# Patient Record
Sex: FEMALE | Race: WHITE
[De-identification: names, ages, dates, MRNs, and addresses within clinical notes are randomized per-mention and may not be internally consistent; named-entity substitution may affect disease eponyms.]

---

## 2018-03-20 ENCOUNTER — HOSPITAL ENCOUNTER (INPATIENT)
Dept: HOSPITAL 88 - ER | Age: 73
LOS: 7 days | Discharge: HOME | DRG: 871 | End: 2018-03-27
Attending: INTERNAL MEDICINE | Admitting: INTERNAL MEDICINE
Payer: MEDICARE

## 2018-03-20 VITALS — DIASTOLIC BLOOD PRESSURE: 59 MMHG | SYSTOLIC BLOOD PRESSURE: 105 MMHG

## 2018-03-20 VITALS — DIASTOLIC BLOOD PRESSURE: 58 MMHG | SYSTOLIC BLOOD PRESSURE: 115 MMHG

## 2018-03-20 VITALS — HEIGHT: 61 IN | WEIGHT: 100.03 LBS | BODY MASS INDEX: 18.88 KG/M2

## 2018-03-20 DIAGNOSIS — J44.1: ICD-10-CM

## 2018-03-20 DIAGNOSIS — J98.11: ICD-10-CM

## 2018-03-20 DIAGNOSIS — A41.9: Primary | ICD-10-CM

## 2018-03-20 DIAGNOSIS — Z72.0: ICD-10-CM

## 2018-03-20 DIAGNOSIS — D64.9: ICD-10-CM

## 2018-03-20 DIAGNOSIS — G40.909: ICD-10-CM

## 2018-03-20 DIAGNOSIS — Z91.81: ICD-10-CM

## 2018-03-20 DIAGNOSIS — E87.1: ICD-10-CM

## 2018-03-20 DIAGNOSIS — J18.9: ICD-10-CM

## 2018-03-20 DIAGNOSIS — F03.90: ICD-10-CM

## 2018-03-20 DIAGNOSIS — E86.0: ICD-10-CM

## 2018-03-20 DIAGNOSIS — J45.901: ICD-10-CM

## 2018-03-20 DIAGNOSIS — F05: ICD-10-CM

## 2018-03-20 LAB
ALBUMIN SERPL-MCNC: 2.7 G/DL (ref 3.5–5)
ALBUMIN/GLOB SERPL: 0.8 {RATIO} (ref 0.8–2)
ALP SERPL-CCNC: 72 IU/L (ref 40–150)
ALT SERPL-CCNC: 8 IU/L (ref 0–55)
ANION GAP SERPL CALC-SCNC: 13.7 MMOL/L (ref 8–16)
BACTERIA URNS QL MICRO: (no result) /HPF
BASE EXCESS BLDA CALC-SCNC: 10 MMOL/L (ref -2–3)
BASOPHILS # BLD AUTO: 0 10*3/UL (ref 0–0.1)
BASOPHILS NFR BLD AUTO: 0.2 % (ref 0–1)
BILIRUB UR QL: NEGATIVE
BUN SERPL-MCNC: 20 MG/DL (ref 7–26)
BUN/CREAT SERPL: 17 (ref 6–25)
CALCIUM SERPL-MCNC: 9.1 MG/DL (ref 8.4–10.2)
CHLORIDE SERPL-SCNC: 87 MMOL/L (ref 98–107)
CK MB SERPL-MCNC: 1.3 NG/ML (ref 0–5)
CK SERPL-CCNC: 290 IU/L (ref 29–168)
CLARITY UR: CLEAR
CO2 SERPL-SCNC: 33 MMOL/L (ref 22–29)
COLOR UR: YELLOW
DEPRECATED APTT PLAS QN: 34.3 SECONDS (ref 23.8–35.5)
DEPRECATED INR PLAS: 1.2
DEPRECATED NEUTROPHILS # BLD AUTO: 10.1 10*3/UL (ref 2.1–6.9)
DEPRECATED RBC URNS MANUAL-ACNC: (no result) /HPF (ref 0–5)
EGFRCR SERPLBLD CKD-EPI 2021: 45 ML/MIN (ref 60–?)
EOSINOPHIL # BLD AUTO: 0 10*3/UL (ref 0–0.4)
EOSINOPHIL NFR BLD AUTO: 0 % (ref 0–6)
EPI CELLS URNS QL MICRO: (no result) /LPF
ERYTHROCYTE [DISTWIDTH] IN CORD BLOOD: 11.7 % (ref 11.7–14.4)
GLOBULIN PLAS-MCNC: 3.5 G/DL (ref 2.3–3.5)
GLUCOSE SERPLBLD-MCNC: 101 MG/DL (ref 74–118)
HCO3 BLDA-SCNC: 35 MMOL/L (ref 23–28)
HCT VFR BLD AUTO: 32.8 % (ref 34.2–44.1)
HGB BLD-MCNC: 10.9 G/DL (ref 12–16)
KETONES UR QL STRIP.AUTO: NEGATIVE
LEUKOCYTE ESTERASE UR QL STRIP.AUTO: NEGATIVE
LYMPHOCYTES # BLD: 1.5 10*3/UL (ref 1–3.2)
LYMPHOCYTES NFR BLD AUTO: 11.6 % (ref 18–39.1)
MAGNESIUM SERPL-MCNC: 1.3 MG/DL (ref 1.3–2.1)
MCH RBC QN AUTO: 33.2 PG (ref 28–32)
MCHC RBC AUTO-ENTMCNC: 33.2 G/DL (ref 31–35)
MCV RBC AUTO: 100 FL (ref 81–99)
MONOCYTES # BLD AUTO: 1 10*3/UL (ref 0.2–0.8)
MONOCYTES NFR BLD AUTO: 7.9 % (ref 4.4–11.3)
NEUTS SEG NFR BLD AUTO: 79.7 % (ref 38.7–80)
NITRITE UR QL STRIP.AUTO: NEGATIVE
PCO2 BLDA: 59 MMHG (ref 41–51)
PCO2 BLDA: 60 MMHG (ref 80–105)
PH BLDA: 7.38 [PH] (ref 7.31–7.41)
PLATELET # BLD AUTO: 170 X10E3/UL (ref 140–360)
POTASSIUM SERPL-SCNC: 3.7 MMOL/L (ref 3.5–5.1)
PROT UR QL STRIP.AUTO: (no result)
PROTHROMBIN TIME: 14.3 SECONDS (ref 11.9–14.5)
RBC # BLD AUTO: 3.28 X10E6/UL (ref 3.6–5.1)
SAO2 % BLDA: 89 % (ref 95–98)
SODIUM SERPL-SCNC: 130 MMOL/L (ref 136–145)
SP GR UR STRIP: 1.01 (ref 1.01–1.02)
UROBILINOGEN UR STRIP-MCNC: 0.2 MG/DL (ref 0.2–1)
WBC #/AREA URNS HPF: (no result) /HPF (ref 0–5)

## 2018-03-20 PROCEDURE — 36415 COLL VENOUS BLD VENIPUNCTURE: CPT

## 2018-03-20 PROCEDURE — 96365 THER/PROPH/DIAG IV INF INIT: CPT

## 2018-03-20 PROCEDURE — 94640 AIRWAY INHALATION TREATMENT: CPT

## 2018-03-20 PROCEDURE — 36600 WITHDRAWAL OF ARTERIAL BLOOD: CPT

## 2018-03-20 PROCEDURE — 80048 BASIC METABOLIC PNL TOTAL CA: CPT

## 2018-03-20 PROCEDURE — 99285 EMERGENCY DEPT VISIT HI MDM: CPT

## 2018-03-20 PROCEDURE — 80053 COMPREHEN METABOLIC PANEL: CPT

## 2018-03-20 PROCEDURE — 87116 MYCOBACTERIA CULTURE: CPT

## 2018-03-20 PROCEDURE — 85730 THROMBOPLASTIN TIME PARTIAL: CPT

## 2018-03-20 PROCEDURE — 87070 CULTURE OTHR SPECIMN AEROBIC: CPT

## 2018-03-20 PROCEDURE — 97139 UNLISTED THERAPEUTIC PX: CPT

## 2018-03-20 PROCEDURE — 85610 PROTHROMBIN TIME: CPT

## 2018-03-20 PROCEDURE — 84484 ASSAY OF TROPONIN QUANT: CPT

## 2018-03-20 PROCEDURE — 87205 SMEAR GRAM STAIN: CPT

## 2018-03-20 PROCEDURE — 87206 SMEAR FLUORESCENT/ACID STAI: CPT

## 2018-03-20 PROCEDURE — 82805 BLOOD GASES W/O2 SATURATION: CPT

## 2018-03-20 PROCEDURE — 81001 URINALYSIS AUTO W/SCOPE: CPT

## 2018-03-20 PROCEDURE — 71260 CT THORAX DX C+: CPT

## 2018-03-20 PROCEDURE — 93005 ELECTROCARDIOGRAM TRACING: CPT

## 2018-03-20 PROCEDURE — 83735 ASSAY OF MAGNESIUM: CPT

## 2018-03-20 PROCEDURE — 82550 ASSAY OF CK (CPK): CPT

## 2018-03-20 PROCEDURE — 85025 COMPLETE CBC W/AUTO DIFF WBC: CPT

## 2018-03-20 PROCEDURE — 71045 X-RAY EXAM CHEST 1 VIEW: CPT

## 2018-03-20 PROCEDURE — 70450 CT HEAD/BRAIN W/O DYE: CPT

## 2018-03-20 PROCEDURE — 82553 CREATINE MB FRACTION: CPT

## 2018-03-20 PROCEDURE — 94660 CPAP INITIATION&MGMT: CPT

## 2018-03-20 PROCEDURE — 36569 INSJ PICC 5 YR+ W/O IMAGING: CPT

## 2018-03-20 PROCEDURE — 83880 ASSAY OF NATRIURETIC PEPTIDE: CPT

## 2018-03-20 RX ADMIN — SODIUM CHLORIDE SCH MLS/HR: 9 INJECTION, SOLUTION INTRAVENOUS at 16:49

## 2018-03-20 NOTE — DIAGNOSTIC IMAGING REPORT
Exam: Head CT without contrast

History:  Dizziness, multiple falls

Comparison studies:  None



Technique:

Axial images were obtained from the skull base to the vertex.

Coronal and sagittal images reconstructed from the axial data.

Intravenous contrast: None



Findings:



Scalp: No abnormalities.

Bones: No fractures, blastic or lytic lesions.



Brain sulci: Mildly prominent.

Ventricles: Within normal limits for size. No hydrocephalus.

Extra-axial spaces: No masses, no fluid collection. 



Parenchyma: 

No mass, acute hemorrhage or acute cortical vascular insults. A few subtle

hypodensities in the supratentorial white matter are nonspecific but most

compatible with chronic small vessel ischemic changes. There is a chronic

cortical based insult with encephalomalacia in the left precentral gyrus just

medial to the hand knob.



Sellar/suprasellar region: No abnormalities.

Craniocervical junction: Patent foramen magnum. No Chiari one malformation.



Included paranasal sinuses: Mild nonspecific inflammatory mucosal thickening

with frothy secretions in the bilateral maxillary sinuses. The left inferior

frontal sinus is partially opacified. 



Incidental findings: 

Atherosclerotic calcifications in the carotid siphons. Bilateral lens

replacements related to previous cataract surgery. 



IMPRESSION:

 

No acute intracranial abnormalities.



Chronic findings:

1.  Mild generalized volume loss.

2.  Small chronic left precentral cortical insult.

3.  Mild supratentorial chronic microvascular ischemic changes.



Signed by: Dr. Jordon Hawthorne M.D. on 3/20/2018 12:00 PM

## 2018-03-20 NOTE — XMS REPORT
Patient Summary Document

 Created on: 2018



MARY LOCO

External Reference #: [id number]

: 1945

Sex: Female



Demographics







 Address  [address] [place]  [address]

 

 Home Phone  [phone]

 

 Preferred Language  Unknown

 

 Marital Status  Unknown

 

 Restorationist Affiliation  Unknown

 

 Race  Unknown

 

 Additional Race(s)  

 

 Ethnic Group  Unknown





Author







 Author  [facility]

 

 [facility]

 

 Address  Unknown

 

 Phone  Unavailable







Care Team Providers







 Care Team Member Name  Role  Phone

 

 MICHAEL BECK  Unavailable  Unavailable







Problems

This patient has no known problems.



Allergies, Adverse Reactions, Alerts

This patient has no known allergies or adverse reactions.



Medications

This patient has no known medications.



Results







 Test Description  Test Time  Test Comments  Text Results  Atomic Results  
Result Comments









 CHEST SINGLE (PORTABLE)            [place] [address]      Patient Name: MARY LOCO   MR #: [id number]    : 1945 Age/Sex: 72/F  Acct #: 
[id number] Req #: [id number]  Adm Physician:     Ordered by: KAVYA JENNINGS NP  Report #: [id number]   Location: ER  Room/Bed:     ________________________
___________________________________________________________________________    
Procedure: [id number] DX/CHEST SINGLE (PORTABLE)  Exam Date: 18           
                 Exam Time: 1130       REPORT STATUS: Signed    PROCEDURE:   A 
single AP view of the chest.       COMPARISON: [facility], DX, 
CHEST SINGLE (PORTABLE),    6/15/2017, 8:49.  Patients [facility], CT, CT 
ABDOMEN/PELVIS WO,    6/15/2017, 9:56.       INDICATIONS:   DIZZY, WEAKNESS    
       FINDINGS:   Lines/tubes:  None.       Lungs: Lungs are well inflated. 
Interval increase in size of right    infrahilar 5.3 x 5.0 cm mass (previously 
measured 3.7 x 4.1 cm). There    is increased prominence of the interstitial 
markings extending from    bilateral danay. No consolidation.       Pleura:  
There is no pleural effusion or pneumothorax.       Heart and mediastinum: 
Cardiac silhouette is unremarkable. Prominence    of bilateral pulmonary 
arteries.   Bones:  No acute bony abnormality. Stable deformity of the left 
ninth    rib, likely reflecting old healed fracture       IMPRESSION:        1. 
interval increase in size in previously visualized right infrahilar    mass, 
suspicious for bronchogenic neoplasm and/or adenopathy. Contrast    enhanced 
chest CT is recommended for further evaluation.   2. Increased prominence of 
interstitial markings extending from    bilateral danay may reflect mild 
interstitial edema, however,    lymphangitic spread of neoplasm is also a 
consideration.               Irene Baumann M.D.     Dictated by:  Irene Baumann M.D. on 3/20/2018 at 12:02        Electronically approved by:  
Irene Baumann M.D. on 3/20/2018 at    12:02                Dictated By: 
IRENE BAUMANN MD  Electronically Signed By: IRENE BAUMANN MD on 18 1202
  Transcribed By: RICK on 18 1202       COPY TO:   KAVYA JENNINGS NP  
         

 

 CT BRAIN WO            [place] [address]      Patient Name: MARY LOCO
   MR #: [id number]    : 1945 Age/Sex: 72/F  Acct #: [id number] Req #
: [id number]  Adm Physician:     Ordered by: KAVYA JENNINGS NP  Report #: 
[id number]   Location: ER  Room/Bed:     ________________________________________
___________________________________________________________    Procedure: [id number]
[id number] CT/CT BRAIN WO  Exam Date: 18                            Exam Time: 
1125       REPORT STATUS: Signed    Exam: Head CT without contrast   History:  
Dizziness, multiple falls   Comparison studies:  None      Technique:   Axial 
images were obtained from the skull base to the vertex.   Coronal and sagittal 
images reconstructed from the axial data.   Intravenous contrast: None      
Findings:      Scalp: No abnormalities.   Bones: No fractures, blastic or lytic 
lesions.      Brain sulci: Mildly prominent.   Ventricles: Within normal limits 
for size. No hydrocephalus.   Extra-axial spaces: No masses, no fluid 
collection.       Parenchyma:    No mass, acute hemorrhage or acute cortical 
vascular insults. A few subtle   hypodensities in the supratentorial white 
matter are nonspecific but most   compatible with chronic small vessel ischemic 
changes. There is a chronic   cortical based insult with encephalomalacia in 
the left precentral gyrus just   medial to the hand knob.      Sellar/
suprasellar region: No abnormalities.   Craniocervical junction: Patent foramen 
magnum. No Chiari one malformation.      Included paranasal sinuses: Mild 
nonspecific inflammatory mucosal thickening   with frothy secretions in the 
bilateral maxillary sinuses. The left inferior   frontal sinus is partially 
opacified.       Incidental findings:    Atherosclerotic calcifications in the 
carotid siphons. Bilateral lens   replacements related to previous cataract 
surgery.       IMPRESSION:       No acute intracranial abnormalities.      
Chronic findings:   1.  Mild generalized volume loss.   2.  Small chronic left 
precentral cortical insult.   3.  Mild supratentorial chronic microvascular 
ischemic changes.      Signed by: Dr. Maddi Hawthorne M.D. on 3/20/2018 12:00 PM
        Dictated By: MADDI HAWTHORNE MD  Electronically Signed By: MADDI HAWTHORNE MD on 18 1200  Transcribed By: MAURI on 18 1200       
COPY TO:   KAVYA JENNINGS NP

## 2018-03-20 NOTE — DIAGNOSTIC IMAGING REPORT
PROCEDURE:

A single AP view of the chest.

 

COMPARISON: Patients [facility], DX, CHEST SINGLE (PORTABLE), 

6/15/2017, 8:49.  Patients [facility], CT, CT ABDOMEN/PELVIS WO, 

6/15/2017, 9:56.

 

INDICATIONS:   DIZZY, WEAKNESS

     

FINDINGS:

Lines/tubes:  None.

 

Lungs: Lungs are well inflated. Interval increase in size of right 

infrahilar 5.3 x 5.0 cm mass (previously measured 3.7 x 4.1 cm). There 

is increased prominence of the interstitial markings extending from 

bilateral daany. No consolidation.

 

Pleura:  There is no pleural effusion or pneumothorax.

 

Heart and mediastinum: Cardiac silhouette is unremarkable. Prominence 

of bilateral pulmonary arteries.

Bones:  No acute bony abnormality. Stable deformity of the left ninth 

rib, likely reflecting old healed fracture

 

IMPRESSION: 

 

1. interval increase in size in previously visualized right infrahilar 

mass, suspicious for bronchogenic neoplasm and/or adenopathy. Contrast 

enhanced chest CT is recommended for further evaluation.

2. Increased prominence of interstitial markings extending from 

bilateral danay may reflect mild interstitial edema, however, 

lymphangitic spread of neoplasm is also a consideration.

 

 

 

Solomon Baumann M.D.  

Dictated by:  Solomon Baumann M.D. on 3/20/2018 at 12:02     

Electronically approved by:  Solomon Baumann M.D. on 3/20/2018 at 

12:02

## 2018-03-21 VITALS — SYSTOLIC BLOOD PRESSURE: 144 MMHG | DIASTOLIC BLOOD PRESSURE: 134 MMHG

## 2018-03-21 VITALS — SYSTOLIC BLOOD PRESSURE: 129 MMHG | DIASTOLIC BLOOD PRESSURE: 118 MMHG

## 2018-03-21 VITALS — SYSTOLIC BLOOD PRESSURE: 126 MMHG | DIASTOLIC BLOOD PRESSURE: 63 MMHG

## 2018-03-21 VITALS — SYSTOLIC BLOOD PRESSURE: 144 MMHG | DIASTOLIC BLOOD PRESSURE: 63 MMHG

## 2018-03-21 VITALS — DIASTOLIC BLOOD PRESSURE: 68 MMHG | SYSTOLIC BLOOD PRESSURE: 138 MMHG

## 2018-03-21 VITALS — DIASTOLIC BLOOD PRESSURE: 76 MMHG | SYSTOLIC BLOOD PRESSURE: 138 MMHG

## 2018-03-21 VITALS — SYSTOLIC BLOOD PRESSURE: 129 MMHG | DIASTOLIC BLOOD PRESSURE: 104 MMHG

## 2018-03-21 VITALS — SYSTOLIC BLOOD PRESSURE: 139 MMHG | DIASTOLIC BLOOD PRESSURE: 63 MMHG

## 2018-03-21 VITALS — DIASTOLIC BLOOD PRESSURE: 60 MMHG | SYSTOLIC BLOOD PRESSURE: 122 MMHG

## 2018-03-21 VITALS — SYSTOLIC BLOOD PRESSURE: 138 MMHG | DIASTOLIC BLOOD PRESSURE: 98 MMHG

## 2018-03-21 VITALS — DIASTOLIC BLOOD PRESSURE: 59 MMHG | SYSTOLIC BLOOD PRESSURE: 122 MMHG

## 2018-03-21 VITALS — DIASTOLIC BLOOD PRESSURE: 60 MMHG | SYSTOLIC BLOOD PRESSURE: 131 MMHG

## 2018-03-21 VITALS — SYSTOLIC BLOOD PRESSURE: 144 MMHG | DIASTOLIC BLOOD PRESSURE: 64 MMHG

## 2018-03-21 VITALS — SYSTOLIC BLOOD PRESSURE: 112 MMHG | DIASTOLIC BLOOD PRESSURE: 69 MMHG

## 2018-03-21 VITALS — DIASTOLIC BLOOD PRESSURE: 63 MMHG | SYSTOLIC BLOOD PRESSURE: 144 MMHG

## 2018-03-21 VITALS — DIASTOLIC BLOOD PRESSURE: 85 MMHG | SYSTOLIC BLOOD PRESSURE: 129 MMHG

## 2018-03-21 VITALS — SYSTOLIC BLOOD PRESSURE: 123 MMHG | DIASTOLIC BLOOD PRESSURE: 105 MMHG

## 2018-03-21 VITALS — SYSTOLIC BLOOD PRESSURE: 126 MMHG | DIASTOLIC BLOOD PRESSURE: 67 MMHG

## 2018-03-21 VITALS — SYSTOLIC BLOOD PRESSURE: 117 MMHG | DIASTOLIC BLOOD PRESSURE: 72 MMHG

## 2018-03-21 VITALS — DIASTOLIC BLOOD PRESSURE: 77 MMHG | SYSTOLIC BLOOD PRESSURE: 141 MMHG

## 2018-03-21 VITALS — DIASTOLIC BLOOD PRESSURE: 58 MMHG | SYSTOLIC BLOOD PRESSURE: 127 MMHG

## 2018-03-21 LAB
ANION GAP SERPL CALC-SCNC: 13.3 MMOL/L (ref 8–16)
BASE EXCESS BLDA CALC-SCNC: 11 MMOL/L (ref -2–3)
BASOPHILS # BLD AUTO: 0 10*3/UL (ref 0–0.1)
BASOPHILS NFR BLD AUTO: 0.2 % (ref 0–1)
BUN SERPL-MCNC: 12 MG/DL (ref 7–26)
BUN/CREAT SERPL: 16 (ref 6–25)
CALCIUM SERPL-MCNC: 9.1 MG/DL (ref 8.4–10.2)
CHLORIDE SERPL-SCNC: 94 MMOL/L (ref 98–107)
CO2 SERPL-SCNC: 33 MMOL/L (ref 22–29)
DEPRECATED NEUTROPHILS # BLD AUTO: 13.4 10*3/UL (ref 2.1–6.9)
EGFRCR SERPLBLD CKD-EPI 2021: > 60 ML/MIN (ref 60–?)
EOSINOPHIL # BLD AUTO: 0 10*3/UL (ref 0–0.4)
EOSINOPHIL NFR BLD AUTO: 0.1 % (ref 0–6)
ERYTHROCYTE [DISTWIDTH] IN CORD BLOOD: 11.8 % (ref 11.7–14.4)
GLUCOSE SERPLBLD-MCNC: 89 MG/DL (ref 74–118)
HCO3 BLDA-SCNC: 35 MMOL/L (ref 23–28)
HCT VFR BLD AUTO: 33.6 % (ref 34.2–44.1)
HGB BLD-MCNC: 10.5 G/DL (ref 12–16)
LYMPHOCYTES # BLD: 1.4 10*3/UL (ref 1–3.2)
LYMPHOCYTES NFR BLD AUTO: 8.5 % (ref 18–39.1)
MCH RBC QN AUTO: 32.3 PG (ref 28–32)
MCHC RBC AUTO-ENTMCNC: 31.3 G/DL (ref 31–35)
MCV RBC AUTO: 103.4 FL (ref 81–99)
MONOCYTES # BLD AUTO: 1.4 10*3/UL (ref 0.2–0.8)
MONOCYTES NFR BLD AUTO: 8.7 % (ref 4.4–11.3)
NEUTS SEG NFR BLD AUTO: 81.7 % (ref 38.7–80)
PCO2 BLDA: 52 MMHG (ref 41–51)
PCO2 BLDA: 65 MMHG (ref 80–105)
PH BLDA: 7.44 [PH] (ref 7.31–7.41)
PLATELET # BLD AUTO: 172 X10E3/UL (ref 140–360)
POTASSIUM SERPL-SCNC: 4.3 MMOL/L (ref 3.5–5.1)
RBC # BLD AUTO: 3.25 X10E6/UL (ref 3.6–5.1)
SAO2 % BLDA: 93 % (ref 95–98)
SODIUM SERPL-SCNC: 136 MMOL/L (ref 136–145)

## 2018-03-21 RX ADMIN — DOXEPIN HYDROCHLORIDE SCH MG: 25 CAPSULE ORAL at 22:30

## 2018-03-21 RX ADMIN — METHYLPREDNISOLONE SODIUM SUCCINATE SCH MG: 40 INJECTION, POWDER, LYOPHILIZED, FOR SOLUTION INTRAMUSCULAR; INTRAVENOUS at 17:58

## 2018-03-21 RX ADMIN — ASPIRIN 81 MG CHEWABLE TABLET SCH MG: 81 TABLET CHEWABLE at 08:30

## 2018-03-21 RX ADMIN — CLONAZEPAM SCH MG: 1 TABLET ORAL at 08:30

## 2018-03-21 RX ADMIN — LEVALBUTEROL HYDROCHLORIDE SCH MG: 1.25 SOLUTION RESPIRATORY (INHALATION) at 16:30

## 2018-03-21 RX ADMIN — TAZOBACTAM SODIUM AND PIPERACILLIN SODIUM SCH MLS/HR: 375; 3 INJECTION, SOLUTION INTRAVENOUS at 22:30

## 2018-03-21 RX ADMIN — SODIUM CHLORIDE SCH MLS/HR: 9 INJECTION, SOLUTION INTRAVENOUS at 19:25

## 2018-03-21 RX ADMIN — Medication SCH MG: at 08:31

## 2018-03-21 RX ADMIN — LEVETIRACETAM SCH MG: 500 TABLET, FILM COATED ORAL at 08:30

## 2018-03-21 RX ADMIN — METHYLPREDNISOLONE SODIUM SUCCINATE SCH MG: 40 INJECTION, POWDER, LYOPHILIZED, FOR SOLUTION INTRAMUSCULAR; INTRAVENOUS at 22:30

## 2018-03-21 RX ADMIN — BISOPROLOL FUMARATE SCH MG: 10 TABLET, COATED ORAL at 13:46

## 2018-03-21 RX ADMIN — BUDESONIDE SCH ML: 0.5 SUSPENSION RESPIRATORY (INHALATION) at 19:00

## 2018-03-21 RX ADMIN — NICOTINE SCH MG: 14 PATCH, EXTENDED RELEASE TRANSDERMAL at 01:02

## 2018-03-21 RX ADMIN — NICOTINE SCH MG: 14 PATCH, EXTENDED RELEASE TRANSDERMAL at 08:31

## 2018-03-21 RX ADMIN — SODIUM CHLORIDE SCH MLS/HR: 9 INJECTION, SOLUTION INTRAVENOUS at 06:05

## 2018-03-21 RX ADMIN — LEVALBUTEROL HYDROCHLORIDE SCH MG: 1.25 SOLUTION RESPIRATORY (INHALATION) at 19:00

## 2018-03-21 RX ADMIN — MONTELUKAST SODIUM SCH MG: 10 TABLET, FILM COATED ORAL at 22:30

## 2018-03-21 RX ADMIN — CLONAZEPAM SCH MG: 1 TABLET ORAL at 17:58

## 2018-03-21 RX ADMIN — LORAZEPAM PRN MG: 2 INJECTION INTRAMUSCULAR; INTRAVENOUS at 11:56

## 2018-03-21 RX ADMIN — SODIUM CHLORIDE PRN MLS/HR: 9 INJECTION, SOLUTION INTRAVENOUS at 21:40

## 2018-03-21 RX ADMIN — TAZOBACTAM SODIUM AND PIPERACILLIN SODIUM SCH MLS/HR: 375; 3 INJECTION, SOLUTION INTRAVENOUS at 17:58

## 2018-03-21 NOTE — CONSULTATION
DATE OF CONSULTATION:    



PULMONARY CONSULTATION 



REASON FOR CONSULTATION:  Shortness of breath and lung mass.



HPI:  Ms. Gonzalez is a 72-year-old female, a 50-pack-year smoker, who 

presented with worsening shortness of breath, cough and intentional weight 

loss.  She is currently confused.  When I was taking her history, she was 

awake and alert.  She is hypoxic as well.  She denies any nausea, vomiting, 

or diarrhea.  Her CT showed evidence of emphysema and possible pneumonia 

and lymph nodes.  It is not showing any clear-cut mass.  



REVIEW OF SYSTEMS

GENERAL:  Denies any fever or chills.  

HEAD:  Denies any head trauma.  

ENT:  Denies any earache, nosebleed, throat pain.  

CVS:  Denies any chest pain.  

RESPIRATORY:  Shortness of breath.  

OTHER:  The rest of the review systems are negative except as in HPI.



PAST MEDICAL HISTORY:  Seizure disorder, anxiety, COPD.



PAST SURGICAL HISTORY:  Unknown.



FAMILY AND SOCIAL HISTORY:  Smoker for 55+ years, 1 pack per day.  Denies 

any alcohol use.

 

PHYSICAL EXAMINATION

VITAL SIGNS:  Temperature 96.6.  Pulse 100.  Blood pressure 139/63.  

Respiratory rate is 20 to 24 per minute, 95% on Venturi mask. 

HEENT:  Head is atraumatic and normocephalic.  Pupils are reactive. 

NECK:  Supple. 

CHEST:  Markedly reduced air entry. 

HEART:  S1, S2 audible. 

ABDOMEN:  Soft, nontender and nondistended. 

EXTREMITIES:  No clubbing, cyanosis or edema. 

NEUROLOGIC:  Awake and alert.



LABS:  ABG showing pH of 7.44, pCO2 52,  pO2 65, O2 sat 93%.  Chemistry:  

Sodium 132, potassium 4.3, chloride 94, BUN 12, creatinine 0.7.  White 

count 16,000, hemoglobin 10.5,  platelets 172.



CT of the chest:  I reviewed the images.  Showing right middle lobe looks 

collapsed, atelectasis and emphysema.  There are small mediastinal lymph 

nodes.  The findings are consistent with pneumonia.  



ASSESSMENT AND PLAN

1. Pneumonia with right middle lobe atelectasis.

2. Altered mental status and psychosis.

3. Hypertension.

4. Chronic obstructive pulmonary disease.



PLAN

1. I will start the patient on IV antibiotics.

2. IV Solu-Medrol.

3. Continue the patient on Keppra.

4. Observe in ICU.  Start the patient on IV Precedex. 

5. Possibly anxiety.  





Detailed discussion was done with Dr. Welch and also discussed with 

family at bedside.  



 



DD:  03/21/2018 16:24

DT:  03/21/2018 16:42

Job#:  [id number]

## 2018-03-21 NOTE — DIAGNOSTIC IMAGING REPORT
PROCEDURE:

CT scan of the chest  WITH intravenous contrast, using standard 

protocol.

 

TECHNIQUE:

The chest was scanned utilizing a multidetector helical scanner from 

the lung apex through the level of the adrenal glands after the IV 

administration of 100 cc of Isovue 370.  Coronal and sagittal 

multiplanar reformations were obtained.

 

Total DLP: 363.82 mGy-cm

 

COMPARISON: Chest x-ray 3/20/2018. CT abdomen and pelvis 6/15/17

 

INDICATIONS:  LUNG MASS, AMS, CONFUSSION

    

FINDINGS:

Lines/tubes:  None.

 

Lungs and Airways:  

Severe centrilobular emphysematous changes.

Diffuse interlobular septal thickening, consistent with mild fluid 

overload.

 

Focal consolidations in the anterior medial right middle lobe and 

lingula. 

Consolidation in the dependent portions of the left lower lobe and 

medial aspect of the right lower lobe.

Mild ill-defined groundglass opacities in the right upper lobe.

 

Collapsed right middle lobe measuring 4.0 x 5.4 cm, which corresponds 

with findings on chest x-ray. (Series 3 image 71)

3.8 mm nodule in the lingula (series 4 image 65).

Calcified granuloma in the right lower lobe.

 

Diffuse bronchial wall thickening especially in both lower lobes.

 

Pleura: The pleural spaces are clear.

 

Heart and mediastinum: The thyroid gland is normal. No significant 

axillary lymphadenopathy is seen. 

0.7 cm short axis prevascular lymph node (series 3 image 40). 

1.1 cm precarinal lymph node (series 3 image 51).

1.1 cm right hilar lymph node (series 3 image 57)

Small left hilar lymph nodes. 

 

The heart and pericardium are within normal limits.

 

Main pulmonary artery measures 3.4 cm in the ascending aorta measures 

3.3 cm. Right pulmonary artery measures 3.2 cm and the left pulmonary 

artery measures 3.0 cm. Straightening of the interventricular septum 

with mild deviation towards the left.

 

Soft tissues: Normal.

 

Abdomen: Calcifications in the 1.6 cm focal fatty infiltration of the 

liver adjacent to falciform ligament. Main pancreatic duct is dilated 

and measures 0.5 cm with a pancreatic divisum variant exiting both the 

minor and major papilla. 0.6 cm cyst in the lower pole of left kidney. 

Severe atherosclerotic calcifications in the aorta. Diffuse gastric 

wall thickening especially the antrum.

 

Mild anterior compression deformity of L1 vertebral body.

 

IMPRESSION: 

 

1. Emphysema with mild edema.

2. Multifocal pneumonia/aspiration.

3. Previous mass seen corresponds with a collapsed right middle lobe.

4. Multiple small mediastinal lymph node, likely related to pneumonia.

5. Diffuse gastric wall thickening especially involving the antrum, 

correlate for gastritis. Recommend endoscopy for further evaluation.

6. Prominent pancreatic duct, which may be related to pancreatic 

divisum variant.

7. Main pulmonary artery is enlarged, which can be seen in elevated 

right pressure. 

 

Dictated by:  Dann Sinha M.D. on 3/21/2018 at 15:47     

Electronically approved by:  Dann Sinha M.D. on 3/21/2018 at 15:47

## 2018-03-22 VITALS — DIASTOLIC BLOOD PRESSURE: 81 MMHG | SYSTOLIC BLOOD PRESSURE: 128 MMHG

## 2018-03-22 VITALS — DIASTOLIC BLOOD PRESSURE: 65 MMHG | SYSTOLIC BLOOD PRESSURE: 116 MMHG

## 2018-03-22 VITALS — SYSTOLIC BLOOD PRESSURE: 137 MMHG | DIASTOLIC BLOOD PRESSURE: 68 MMHG

## 2018-03-22 VITALS — SYSTOLIC BLOOD PRESSURE: 125 MMHG | DIASTOLIC BLOOD PRESSURE: 96 MMHG

## 2018-03-22 VITALS — DIASTOLIC BLOOD PRESSURE: 77 MMHG | SYSTOLIC BLOOD PRESSURE: 137 MMHG

## 2018-03-22 VITALS — DIASTOLIC BLOOD PRESSURE: 88 MMHG | SYSTOLIC BLOOD PRESSURE: 143 MMHG

## 2018-03-22 VITALS — DIASTOLIC BLOOD PRESSURE: 71 MMHG | SYSTOLIC BLOOD PRESSURE: 129 MMHG

## 2018-03-22 VITALS — DIASTOLIC BLOOD PRESSURE: 58 MMHG | SYSTOLIC BLOOD PRESSURE: 117 MMHG

## 2018-03-22 VITALS — DIASTOLIC BLOOD PRESSURE: 71 MMHG | SYSTOLIC BLOOD PRESSURE: 134 MMHG

## 2018-03-22 VITALS — SYSTOLIC BLOOD PRESSURE: 111 MMHG | DIASTOLIC BLOOD PRESSURE: 71 MMHG

## 2018-03-22 VITALS — SYSTOLIC BLOOD PRESSURE: 145 MMHG | DIASTOLIC BLOOD PRESSURE: 67 MMHG

## 2018-03-22 VITALS — DIASTOLIC BLOOD PRESSURE: 126 MMHG | SYSTOLIC BLOOD PRESSURE: 153 MMHG

## 2018-03-22 VITALS — SYSTOLIC BLOOD PRESSURE: 112 MMHG | DIASTOLIC BLOOD PRESSURE: 58 MMHG

## 2018-03-22 VITALS — DIASTOLIC BLOOD PRESSURE: 49 MMHG | SYSTOLIC BLOOD PRESSURE: 107 MMHG

## 2018-03-22 VITALS — SYSTOLIC BLOOD PRESSURE: 131 MMHG | DIASTOLIC BLOOD PRESSURE: 58 MMHG

## 2018-03-22 VITALS — DIASTOLIC BLOOD PRESSURE: 76 MMHG | SYSTOLIC BLOOD PRESSURE: 130 MMHG

## 2018-03-22 VITALS — SYSTOLIC BLOOD PRESSURE: 128 MMHG | DIASTOLIC BLOOD PRESSURE: 53 MMHG

## 2018-03-22 VITALS — SYSTOLIC BLOOD PRESSURE: 127 MMHG | DIASTOLIC BLOOD PRESSURE: 67 MMHG

## 2018-03-22 VITALS — DIASTOLIC BLOOD PRESSURE: 73 MMHG | SYSTOLIC BLOOD PRESSURE: 129 MMHG

## 2018-03-22 VITALS — SYSTOLIC BLOOD PRESSURE: 125 MMHG | DIASTOLIC BLOOD PRESSURE: 64 MMHG

## 2018-03-22 VITALS — DIASTOLIC BLOOD PRESSURE: 80 MMHG | SYSTOLIC BLOOD PRESSURE: 114 MMHG

## 2018-03-22 VITALS — DIASTOLIC BLOOD PRESSURE: 81 MMHG | SYSTOLIC BLOOD PRESSURE: 130 MMHG

## 2018-03-22 VITALS — DIASTOLIC BLOOD PRESSURE: 62 MMHG | SYSTOLIC BLOOD PRESSURE: 127 MMHG

## 2018-03-22 VITALS — SYSTOLIC BLOOD PRESSURE: 132 MMHG | DIASTOLIC BLOOD PRESSURE: 65 MMHG

## 2018-03-22 VITALS — DIASTOLIC BLOOD PRESSURE: 59 MMHG | SYSTOLIC BLOOD PRESSURE: 84 MMHG

## 2018-03-22 VITALS — SYSTOLIC BLOOD PRESSURE: 149 MMHG | DIASTOLIC BLOOD PRESSURE: 61 MMHG

## 2018-03-22 VITALS — DIASTOLIC BLOOD PRESSURE: 66 MMHG | SYSTOLIC BLOOD PRESSURE: 148 MMHG

## 2018-03-22 VITALS — SYSTOLIC BLOOD PRESSURE: 129 MMHG | DIASTOLIC BLOOD PRESSURE: 80 MMHG

## 2018-03-22 VITALS — DIASTOLIC BLOOD PRESSURE: 77 MMHG | SYSTOLIC BLOOD PRESSURE: 119 MMHG

## 2018-03-22 VITALS — SYSTOLIC BLOOD PRESSURE: 142 MMHG | DIASTOLIC BLOOD PRESSURE: 62 MMHG

## 2018-03-22 VITALS — DIASTOLIC BLOOD PRESSURE: 51 MMHG | SYSTOLIC BLOOD PRESSURE: 106 MMHG

## 2018-03-22 VITALS — SYSTOLIC BLOOD PRESSURE: 135 MMHG | DIASTOLIC BLOOD PRESSURE: 60 MMHG

## 2018-03-22 VITALS — SYSTOLIC BLOOD PRESSURE: 122 MMHG | DIASTOLIC BLOOD PRESSURE: 87 MMHG

## 2018-03-22 VITALS — SYSTOLIC BLOOD PRESSURE: 117 MMHG | DIASTOLIC BLOOD PRESSURE: 71 MMHG

## 2018-03-22 VITALS — SYSTOLIC BLOOD PRESSURE: 120 MMHG | DIASTOLIC BLOOD PRESSURE: 56 MMHG

## 2018-03-22 VITALS — SYSTOLIC BLOOD PRESSURE: 118 MMHG | DIASTOLIC BLOOD PRESSURE: 62 MMHG

## 2018-03-22 VITALS — SYSTOLIC BLOOD PRESSURE: 167 MMHG | DIASTOLIC BLOOD PRESSURE: 81 MMHG

## 2018-03-22 VITALS — DIASTOLIC BLOOD PRESSURE: 83 MMHG | SYSTOLIC BLOOD PRESSURE: 126 MMHG

## 2018-03-22 VITALS — SYSTOLIC BLOOD PRESSURE: 127 MMHG | DIASTOLIC BLOOD PRESSURE: 64 MMHG

## 2018-03-22 VITALS — DIASTOLIC BLOOD PRESSURE: 68 MMHG | SYSTOLIC BLOOD PRESSURE: 122 MMHG

## 2018-03-22 VITALS — DIASTOLIC BLOOD PRESSURE: 70 MMHG | SYSTOLIC BLOOD PRESSURE: 92 MMHG

## 2018-03-22 VITALS — DIASTOLIC BLOOD PRESSURE: 62 MMHG | SYSTOLIC BLOOD PRESSURE: 142 MMHG

## 2018-03-22 VITALS — SYSTOLIC BLOOD PRESSURE: 138 MMHG | DIASTOLIC BLOOD PRESSURE: 68 MMHG

## 2018-03-22 VITALS — DIASTOLIC BLOOD PRESSURE: 46 MMHG | SYSTOLIC BLOOD PRESSURE: 100 MMHG

## 2018-03-22 VITALS — SYSTOLIC BLOOD PRESSURE: 109 MMHG | DIASTOLIC BLOOD PRESSURE: 34 MMHG

## 2018-03-22 VITALS — DIASTOLIC BLOOD PRESSURE: 61 MMHG | SYSTOLIC BLOOD PRESSURE: 128 MMHG

## 2018-03-22 VITALS — SYSTOLIC BLOOD PRESSURE: 130 MMHG | DIASTOLIC BLOOD PRESSURE: 63 MMHG

## 2018-03-22 VITALS — DIASTOLIC BLOOD PRESSURE: 58 MMHG | SYSTOLIC BLOOD PRESSURE: 102 MMHG

## 2018-03-22 VITALS — DIASTOLIC BLOOD PRESSURE: 60 MMHG | SYSTOLIC BLOOD PRESSURE: 117 MMHG

## 2018-03-22 VITALS — DIASTOLIC BLOOD PRESSURE: 58 MMHG | SYSTOLIC BLOOD PRESSURE: 120 MMHG

## 2018-03-22 VITALS — DIASTOLIC BLOOD PRESSURE: 62 MMHG | SYSTOLIC BLOOD PRESSURE: 115 MMHG

## 2018-03-22 VITALS — DIASTOLIC BLOOD PRESSURE: 75 MMHG | SYSTOLIC BLOOD PRESSURE: 106 MMHG

## 2018-03-22 VITALS — SYSTOLIC BLOOD PRESSURE: 113 MMHG | DIASTOLIC BLOOD PRESSURE: 85 MMHG

## 2018-03-22 VITALS — SYSTOLIC BLOOD PRESSURE: 152 MMHG | DIASTOLIC BLOOD PRESSURE: 95 MMHG

## 2018-03-22 VITALS — DIASTOLIC BLOOD PRESSURE: 49 MMHG | SYSTOLIC BLOOD PRESSURE: 109 MMHG

## 2018-03-22 VITALS — SYSTOLIC BLOOD PRESSURE: 120 MMHG | DIASTOLIC BLOOD PRESSURE: 70 MMHG

## 2018-03-22 VITALS — DIASTOLIC BLOOD PRESSURE: 63 MMHG | SYSTOLIC BLOOD PRESSURE: 146 MMHG

## 2018-03-22 VITALS — DIASTOLIC BLOOD PRESSURE: 68 MMHG | SYSTOLIC BLOOD PRESSURE: 130 MMHG

## 2018-03-22 VITALS — DIASTOLIC BLOOD PRESSURE: 59 MMHG | SYSTOLIC BLOOD PRESSURE: 113 MMHG

## 2018-03-22 VITALS — SYSTOLIC BLOOD PRESSURE: 121 MMHG | DIASTOLIC BLOOD PRESSURE: 57 MMHG

## 2018-03-22 VITALS — DIASTOLIC BLOOD PRESSURE: 94 MMHG | SYSTOLIC BLOOD PRESSURE: 137 MMHG

## 2018-03-22 VITALS — SYSTOLIC BLOOD PRESSURE: 135 MMHG | DIASTOLIC BLOOD PRESSURE: 58 MMHG

## 2018-03-22 VITALS — DIASTOLIC BLOOD PRESSURE: 61 MMHG | SYSTOLIC BLOOD PRESSURE: 134 MMHG

## 2018-03-22 VITALS — SYSTOLIC BLOOD PRESSURE: 143 MMHG | DIASTOLIC BLOOD PRESSURE: 75 MMHG

## 2018-03-22 VITALS — DIASTOLIC BLOOD PRESSURE: 72 MMHG | SYSTOLIC BLOOD PRESSURE: 137 MMHG

## 2018-03-22 VITALS — SYSTOLIC BLOOD PRESSURE: 117 MMHG | DIASTOLIC BLOOD PRESSURE: 84 MMHG

## 2018-03-22 VITALS — SYSTOLIC BLOOD PRESSURE: 121 MMHG | DIASTOLIC BLOOD PRESSURE: 65 MMHG

## 2018-03-22 VITALS — SYSTOLIC BLOOD PRESSURE: 114 MMHG | DIASTOLIC BLOOD PRESSURE: 54 MMHG

## 2018-03-22 VITALS — DIASTOLIC BLOOD PRESSURE: 72 MMHG | SYSTOLIC BLOOD PRESSURE: 110 MMHG

## 2018-03-22 VITALS — SYSTOLIC BLOOD PRESSURE: 141 MMHG | DIASTOLIC BLOOD PRESSURE: 101 MMHG

## 2018-03-22 VITALS — SYSTOLIC BLOOD PRESSURE: 134 MMHG | DIASTOLIC BLOOD PRESSURE: 57 MMHG

## 2018-03-22 VITALS — DIASTOLIC BLOOD PRESSURE: 48 MMHG | SYSTOLIC BLOOD PRESSURE: 102 MMHG

## 2018-03-22 VITALS — SYSTOLIC BLOOD PRESSURE: 129 MMHG | DIASTOLIC BLOOD PRESSURE: 62 MMHG

## 2018-03-22 VITALS — DIASTOLIC BLOOD PRESSURE: 59 MMHG | SYSTOLIC BLOOD PRESSURE: 129 MMHG

## 2018-03-22 VITALS — DIASTOLIC BLOOD PRESSURE: 62 MMHG | SYSTOLIC BLOOD PRESSURE: 129 MMHG

## 2018-03-22 VITALS — SYSTOLIC BLOOD PRESSURE: 114 MMHG | DIASTOLIC BLOOD PRESSURE: 48 MMHG

## 2018-03-22 VITALS — DIASTOLIC BLOOD PRESSURE: 62 MMHG | SYSTOLIC BLOOD PRESSURE: 122 MMHG

## 2018-03-22 VITALS — SYSTOLIC BLOOD PRESSURE: 100 MMHG | DIASTOLIC BLOOD PRESSURE: 87 MMHG

## 2018-03-22 VITALS — SYSTOLIC BLOOD PRESSURE: 135 MMHG | DIASTOLIC BLOOD PRESSURE: 64 MMHG

## 2018-03-22 VITALS — DIASTOLIC BLOOD PRESSURE: 60 MMHG | SYSTOLIC BLOOD PRESSURE: 124 MMHG

## 2018-03-22 VITALS — DIASTOLIC BLOOD PRESSURE: 65 MMHG | SYSTOLIC BLOOD PRESSURE: 154 MMHG

## 2018-03-22 VITALS — DIASTOLIC BLOOD PRESSURE: 70 MMHG | SYSTOLIC BLOOD PRESSURE: 139 MMHG

## 2018-03-22 VITALS — SYSTOLIC BLOOD PRESSURE: 103 MMHG | DIASTOLIC BLOOD PRESSURE: 64 MMHG

## 2018-03-22 VITALS — SYSTOLIC BLOOD PRESSURE: 98 MMHG | DIASTOLIC BLOOD PRESSURE: 70 MMHG

## 2018-03-22 VITALS — DIASTOLIC BLOOD PRESSURE: 58 MMHG | SYSTOLIC BLOOD PRESSURE: 119 MMHG

## 2018-03-22 VITALS — DIASTOLIC BLOOD PRESSURE: 60 MMHG | SYSTOLIC BLOOD PRESSURE: 125 MMHG

## 2018-03-22 VITALS — SYSTOLIC BLOOD PRESSURE: 142 MMHG | DIASTOLIC BLOOD PRESSURE: 73 MMHG

## 2018-03-22 VITALS — SYSTOLIC BLOOD PRESSURE: 133 MMHG | DIASTOLIC BLOOD PRESSURE: 69 MMHG

## 2018-03-22 VITALS — DIASTOLIC BLOOD PRESSURE: 57 MMHG | SYSTOLIC BLOOD PRESSURE: 115 MMHG

## 2018-03-22 VITALS — DIASTOLIC BLOOD PRESSURE: 101 MMHG | SYSTOLIC BLOOD PRESSURE: 109 MMHG

## 2018-03-22 VITALS — SYSTOLIC BLOOD PRESSURE: 125 MMHG | DIASTOLIC BLOOD PRESSURE: 80 MMHG

## 2018-03-22 LAB
ALBUMIN SERPL-MCNC: 2.4 G/DL (ref 3.5–5)
ALBUMIN/GLOB SERPL: 0.8 {RATIO} (ref 0.8–2)
ALP SERPL-CCNC: 64 IU/L (ref 40–150)
ALT SERPL-CCNC: 10 IU/L (ref 0–55)
ANION GAP SERPL CALC-SCNC: 15.1 MMOL/L (ref 8–16)
BASOPHILS # BLD AUTO: 0 10*3/UL (ref 0–0.1)
BASOPHILS NFR BLD AUTO: 0.1 % (ref 0–1)
BUN SERPL-MCNC: 9 MG/DL (ref 7–26)
BUN/CREAT SERPL: 14 (ref 6–25)
CALCIUM SERPL-MCNC: 8.7 MG/DL (ref 8.4–10.2)
CHLORIDE SERPL-SCNC: 101 MMOL/L (ref 98–107)
CO2 SERPL-SCNC: 31 MMOL/L (ref 22–29)
DEPRECATED NEUTROPHILS # BLD AUTO: 7.8 10*3/UL (ref 2.1–6.9)
EGFRCR SERPLBLD CKD-EPI 2021: > 60 ML/MIN (ref 60–?)
EOSINOPHIL # BLD AUTO: 0 10*3/UL (ref 0–0.4)
EOSINOPHIL NFR BLD AUTO: 0 % (ref 0–6)
ERYTHROCYTE [DISTWIDTH] IN CORD BLOOD: 11.9 % (ref 11.7–14.4)
GLOBULIN PLAS-MCNC: 3.1 G/DL (ref 2.3–3.5)
GLUCOSE SERPLBLD-MCNC: 110 MG/DL (ref 74–118)
HCT VFR BLD AUTO: 31 % (ref 34.2–44.1)
HGB BLD-MCNC: 9.8 G/DL (ref 12–16)
LYMPHOCYTES # BLD: 0.6 10*3/UL (ref 1–3.2)
LYMPHOCYTES NFR BLD AUTO: 7.1 % (ref 18–39.1)
MCH RBC QN AUTO: 32.6 PG (ref 28–32)
MCHC RBC AUTO-ENTMCNC: 31.6 G/DL (ref 31–35)
MCV RBC AUTO: 103 FL (ref 81–99)
MONOCYTES # BLD AUTO: 0.1 10*3/UL (ref 0.2–0.8)
MONOCYTES NFR BLD AUTO: 1.5 % (ref 4.4–11.3)
NEUTS SEG NFR BLD AUTO: 90.7 % (ref 38.7–80)
PLATELET # BLD AUTO: 186 X10E3/UL (ref 140–360)
POTASSIUM SERPL-SCNC: 4.1 MMOL/L (ref 3.5–5.1)
RBC # BLD AUTO: 3.01 X10E6/UL (ref 3.6–5.1)
SODIUM SERPL-SCNC: 143 MMOL/L (ref 136–145)

## 2018-03-22 PROCEDURE — 02HV33Z INSERTION OF INFUSION DEVICE INTO SUPERIOR VENA CAVA, PERCUTANEOUS APPROACH: ICD-10-PCS

## 2018-03-22 RX ADMIN — CLONAZEPAM SCH MG: 0.5 TABLET ORAL at 21:41

## 2018-03-22 RX ADMIN — ASPIRIN 81 MG CHEWABLE TABLET SCH MG: 81 TABLET CHEWABLE at 09:30

## 2018-03-22 RX ADMIN — BUDESONIDE SCH ML: 0.5 SUSPENSION RESPIRATORY (INHALATION) at 20:15

## 2018-03-22 RX ADMIN — LEVETIRACETAM SCH MG: 500 TABLET, FILM COATED ORAL at 09:30

## 2018-03-22 RX ADMIN — LEVALBUTEROL HYDROCHLORIDE SCH MG: 1.25 SOLUTION RESPIRATORY (INHALATION) at 07:30

## 2018-03-22 RX ADMIN — TAZOBACTAM SODIUM AND PIPERACILLIN SODIUM SCH MLS/HR: 375; 3 INJECTION, SOLUTION INTRAVENOUS at 14:00

## 2018-03-22 RX ADMIN — METHYLPREDNISOLONE SODIUM SUCCINATE SCH MG: 40 INJECTION, POWDER, LYOPHILIZED, FOR SOLUTION INTRAMUSCULAR; INTRAVENOUS at 21:41

## 2018-03-22 RX ADMIN — BISOPROLOL FUMARATE SCH MG: 10 TABLET, COATED ORAL at 09:30

## 2018-03-22 RX ADMIN — CLONAZEPAM SCH MG: 1 TABLET ORAL at 17:00

## 2018-03-22 RX ADMIN — METHYLPREDNISOLONE SODIUM SUCCINATE SCH MG: 40 INJECTION, POWDER, LYOPHILIZED, FOR SOLUTION INTRAMUSCULAR; INTRAVENOUS at 14:00

## 2018-03-22 RX ADMIN — SODIUM CHLORIDE SCH MLS/HR: 9 INJECTION, SOLUTION INTRAVENOUS at 22:05

## 2018-03-22 RX ADMIN — LEVALBUTEROL HYDROCHLORIDE SCH MG: 1.25 SOLUTION RESPIRATORY (INHALATION) at 14:00

## 2018-03-22 RX ADMIN — METHYLPREDNISOLONE SODIUM SUCCINATE SCH MG: 40 INJECTION, POWDER, LYOPHILIZED, FOR SOLUTION INTRAMUSCULAR; INTRAVENOUS at 06:23

## 2018-03-22 RX ADMIN — TAZOBACTAM SODIUM AND PIPERACILLIN SODIUM SCH MLS/HR: 375; 3 INJECTION, SOLUTION INTRAVENOUS at 21:41

## 2018-03-22 RX ADMIN — SODIUM CHLORIDE SCH MLS/HR: 9 INJECTION, SOLUTION INTRAVENOUS at 09:30

## 2018-03-22 RX ADMIN — LEVALBUTEROL HYDROCHLORIDE SCH MG: 1.25 SOLUTION RESPIRATORY (INHALATION) at 20:15

## 2018-03-22 RX ADMIN — NICOTINE SCH MG: 14 PATCH, EXTENDED RELEASE TRANSDERMAL at 09:00

## 2018-03-22 RX ADMIN — TAZOBACTAM SODIUM AND PIPERACILLIN SODIUM SCH MLS/HR: 375; 3 INJECTION, SOLUTION INTRAVENOUS at 06:23

## 2018-03-22 RX ADMIN — LEVALBUTEROL HYDROCHLORIDE SCH MG: 1.25 SOLUTION RESPIRATORY (INHALATION) at 01:00

## 2018-03-22 RX ADMIN — DOXEPIN HYDROCHLORIDE SCH MG: 25 CAPSULE ORAL at 21:41

## 2018-03-22 RX ADMIN — LORAZEPAM PRN MG: 2 INJECTION INTRAMUSCULAR; INTRAVENOUS at 00:06

## 2018-03-22 RX ADMIN — MONTELUKAST SODIUM SCH MG: 10 TABLET, FILM COATED ORAL at 21:41

## 2018-03-22 RX ADMIN — BUDESONIDE SCH ML: 0.5 SUSPENSION RESPIRATORY (INHALATION) at 07:30

## 2018-03-22 RX ADMIN — Medication SCH MG: at 09:30

## 2018-03-22 RX ADMIN — SODIUM CHLORIDE PRN MLS/HR: 9 INJECTION, SOLUTION INTRAVENOUS at 21:42

## 2018-03-22 RX ADMIN — CLONAZEPAM SCH MG: 1 TABLET ORAL at 09:30

## 2018-03-22 NOTE — CONSULTATION
DATE OF CONSULTATION:  March 22, 2018 



PSYCHIATRIC CONSULTATION



REASON FOR CONSULTATION:  To evaluate patient's psychosis.



HISTORY OF PRESENTING ILLNESS:  The patient is a 72-year-old  

female admitted to the hospital for dizziness and recurrent falls. 

Psychiatric consultation is called to evaluate the patient's psychosis. As 

per the medical record, patient has history of seizure, anxiety and COPD.



Upon evaluation today, patient is found to be in the ICU. She is with a 

one-to-one. Her family member is in the room. Patient is alert, awake and 

oriented to self only. She recognized family members but does not now where 

she is or the current year. She is restless, fidgeting, trying to pull off 

her pulse ox. Patient is unable to answer questions appropriately. She is 

very confused. She is unable to answer any other questions due to 

confusion.



Collaborative report from patient's  and granddaughter who report 

that patient was doing well on Friday; however, she fell at home and her 

oxygen was low. She was taken to the hospital. Since then she has been very 

confused. They also report that has been confused in the past when she is 

admitted to the hospital.



PAST PSYCHIATRIC HISTORY:  Patient reports history of anxiety. She denies 

past suicide attempt. She denies alcohol or drug use.



FAMILY HISTORY:  Unknown.



SOCIAL HISTORY:  Patient lives with her .



MENTAL STATUS EXAMINATION:  The patient is an elderly  female. She 

is alert, awake and oriented to self. She is very confused and restless. 

She has not been interacting with external stimuli. Her insight and 

judgment are poor. Memory is impaired. She is unable to report if she is 

having any suicidal thoughts or homicidal thoughts. Affect is congruent 

with mood. Her mood is anxious. Thought process is loose. She does not 

elicit paranoia or delusional thinking.



Current medications include 

1. Budesonide.

2. Levalbuterol.

3. Zosyn.

4. Methylprednisolone.

5. Ativan 1 mg IV q.4 h. p.r.n. 

6. Singulair.

7. Doxepin.

8. Klonopin 1 mg p.o. b.i.d. 

9. Bisoprolol. 

10. Multivitamins.

11. Magnesium oxide.

12. Nicotine.

13. Keppra.

14. Aspirin.

15. Sodium chloride.

16. Antivert.

17. Zofran.



LABS:  WBC is 8.64, RBC 3.01, hemoglobin 9.8, hematocrit 31.0, platelet is 

186. Sodium 143, potassium 4.1, chloride 101, BUN 9, CO2 31.



ASSESSMENT:   Unspecified psychosis/delirium, multifactorial. Rule out 

dementia.



PLAN

1. To cover as per medical.

2. Continue Ativan 1 mg IV q.4 h. p.r.n.

3. Continue doxepin.

4. Continue Klonopin 1 mg p.o. b.i.d. 

5. Add Seroquel 25 mg p.o. q.6 h. p.r.n.

6. Add Haldol 2 mg IM q.6 h. p.r.n. 

7. Monitor for agitation.

8. Discuss with nursing staff.



Thank you for this consultation.



Dictated by:  LENO Otoole 

 



 





DD:  03/22/2018 16:42

DT:  03/22/2018 16:45

Job#:  [id number]

## 2018-03-22 NOTE — DIAGNOSTIC IMAGING REPORT
EXAM: CHEST XRAY LINE PLACEMENT, AP 1 view

INDICATION: Line placement

COMPARISON: AP view of the chest March 20, 2018



FINDINGS:

LINES/TUBES: Interval placement of right approach PICC that terminates at the

expected location of the distal superior vena cava.



LUNGS: Stable appearance of the right hilar mass given differences in

projection. Stable nodular density projects over the right mid lung and right

lung base.



PLEURA: No effusions or pneumothorax.



HEART AND MEDIASTINUM: The heart is within normal size limits. Enlargement of

the mediastinum suggests lymphadenopathy.



BONES AND SOFT TISSUES: No acute findings. 



IMPRESSION:

The right approach PICC terminates at the expected location of the distal

superior vena cava.







Signed by: Dr. Layne Chakraborty M.D. on 3/22/2018 9:34 PM

## 2018-03-23 VITALS — SYSTOLIC BLOOD PRESSURE: 122 MMHG | DIASTOLIC BLOOD PRESSURE: 74 MMHG

## 2018-03-23 VITALS — SYSTOLIC BLOOD PRESSURE: 128 MMHG | DIASTOLIC BLOOD PRESSURE: 67 MMHG

## 2018-03-23 VITALS — SYSTOLIC BLOOD PRESSURE: 100 MMHG | DIASTOLIC BLOOD PRESSURE: 52 MMHG

## 2018-03-23 VITALS — SYSTOLIC BLOOD PRESSURE: 146 MMHG | DIASTOLIC BLOOD PRESSURE: 68 MMHG

## 2018-03-23 VITALS — SYSTOLIC BLOOD PRESSURE: 120 MMHG | DIASTOLIC BLOOD PRESSURE: 62 MMHG

## 2018-03-23 VITALS — DIASTOLIC BLOOD PRESSURE: 79 MMHG | SYSTOLIC BLOOD PRESSURE: 117 MMHG

## 2018-03-23 VITALS — DIASTOLIC BLOOD PRESSURE: 56 MMHG | SYSTOLIC BLOOD PRESSURE: 126 MMHG

## 2018-03-23 VITALS — DIASTOLIC BLOOD PRESSURE: 66 MMHG | SYSTOLIC BLOOD PRESSURE: 155 MMHG

## 2018-03-23 VITALS — DIASTOLIC BLOOD PRESSURE: 67 MMHG | SYSTOLIC BLOOD PRESSURE: 131 MMHG

## 2018-03-23 VITALS — DIASTOLIC BLOOD PRESSURE: 55 MMHG | SYSTOLIC BLOOD PRESSURE: 113 MMHG

## 2018-03-23 VITALS — SYSTOLIC BLOOD PRESSURE: 95 MMHG | DIASTOLIC BLOOD PRESSURE: 71 MMHG

## 2018-03-23 VITALS — SYSTOLIC BLOOD PRESSURE: 113 MMHG | DIASTOLIC BLOOD PRESSURE: 51 MMHG

## 2018-03-23 VITALS — SYSTOLIC BLOOD PRESSURE: 125 MMHG | DIASTOLIC BLOOD PRESSURE: 51 MMHG

## 2018-03-23 VITALS — SYSTOLIC BLOOD PRESSURE: 113 MMHG | DIASTOLIC BLOOD PRESSURE: 94 MMHG

## 2018-03-23 VITALS — SYSTOLIC BLOOD PRESSURE: 124 MMHG | DIASTOLIC BLOOD PRESSURE: 54 MMHG

## 2018-03-23 VITALS — SYSTOLIC BLOOD PRESSURE: 115 MMHG | DIASTOLIC BLOOD PRESSURE: 54 MMHG

## 2018-03-23 VITALS — SYSTOLIC BLOOD PRESSURE: 131 MMHG | DIASTOLIC BLOOD PRESSURE: 71 MMHG

## 2018-03-23 VITALS — DIASTOLIC BLOOD PRESSURE: 60 MMHG | SYSTOLIC BLOOD PRESSURE: 98 MMHG

## 2018-03-23 VITALS — SYSTOLIC BLOOD PRESSURE: 162 MMHG | DIASTOLIC BLOOD PRESSURE: 67 MMHG

## 2018-03-23 VITALS — DIASTOLIC BLOOD PRESSURE: 118 MMHG | SYSTOLIC BLOOD PRESSURE: 140 MMHG

## 2018-03-23 VITALS — SYSTOLIC BLOOD PRESSURE: 124 MMHG | DIASTOLIC BLOOD PRESSURE: 56 MMHG

## 2018-03-23 VITALS — DIASTOLIC BLOOD PRESSURE: 58 MMHG | SYSTOLIC BLOOD PRESSURE: 123 MMHG

## 2018-03-23 VITALS — SYSTOLIC BLOOD PRESSURE: 110 MMHG | DIASTOLIC BLOOD PRESSURE: 62 MMHG

## 2018-03-23 VITALS — DIASTOLIC BLOOD PRESSURE: 65 MMHG | SYSTOLIC BLOOD PRESSURE: 133 MMHG

## 2018-03-23 VITALS — SYSTOLIC BLOOD PRESSURE: 147 MMHG | DIASTOLIC BLOOD PRESSURE: 56 MMHG

## 2018-03-23 VITALS — SYSTOLIC BLOOD PRESSURE: 125 MMHG | DIASTOLIC BLOOD PRESSURE: 76 MMHG

## 2018-03-23 VITALS — SYSTOLIC BLOOD PRESSURE: 108 MMHG | DIASTOLIC BLOOD PRESSURE: 54 MMHG

## 2018-03-23 VITALS — SYSTOLIC BLOOD PRESSURE: 117 MMHG | DIASTOLIC BLOOD PRESSURE: 66 MMHG

## 2018-03-23 VITALS — SYSTOLIC BLOOD PRESSURE: 102 MMHG | DIASTOLIC BLOOD PRESSURE: 54 MMHG

## 2018-03-23 VITALS — DIASTOLIC BLOOD PRESSURE: 72 MMHG | SYSTOLIC BLOOD PRESSURE: 169 MMHG

## 2018-03-23 VITALS — DIASTOLIC BLOOD PRESSURE: 50 MMHG | SYSTOLIC BLOOD PRESSURE: 97 MMHG

## 2018-03-23 VITALS — SYSTOLIC BLOOD PRESSURE: 129 MMHG | DIASTOLIC BLOOD PRESSURE: 53 MMHG

## 2018-03-23 VITALS — DIASTOLIC BLOOD PRESSURE: 61 MMHG | SYSTOLIC BLOOD PRESSURE: 114 MMHG

## 2018-03-23 VITALS — SYSTOLIC BLOOD PRESSURE: 114 MMHG | DIASTOLIC BLOOD PRESSURE: 83 MMHG

## 2018-03-23 VITALS — SYSTOLIC BLOOD PRESSURE: 126 MMHG | DIASTOLIC BLOOD PRESSURE: 62 MMHG

## 2018-03-23 VITALS — SYSTOLIC BLOOD PRESSURE: 148 MMHG | DIASTOLIC BLOOD PRESSURE: 64 MMHG

## 2018-03-23 VITALS — DIASTOLIC BLOOD PRESSURE: 63 MMHG | SYSTOLIC BLOOD PRESSURE: 136 MMHG

## 2018-03-23 VITALS — DIASTOLIC BLOOD PRESSURE: 62 MMHG | SYSTOLIC BLOOD PRESSURE: 142 MMHG

## 2018-03-23 VITALS — SYSTOLIC BLOOD PRESSURE: 105 MMHG | DIASTOLIC BLOOD PRESSURE: 51 MMHG

## 2018-03-23 VITALS — DIASTOLIC BLOOD PRESSURE: 67 MMHG | SYSTOLIC BLOOD PRESSURE: 128 MMHG

## 2018-03-23 LAB
ALBUMIN SERPL-MCNC: 2.2 G/DL (ref 3.5–5)
ALBUMIN/GLOB SERPL: 0.8 {RATIO} (ref 0.8–2)
ALP SERPL-CCNC: 55 IU/L (ref 40–150)
ALT SERPL-CCNC: 12 IU/L (ref 0–55)
ANION GAP SERPL CALC-SCNC: 10.7 MMOL/L (ref 8–16)
BASOPHILS # BLD AUTO: 0 10*3/UL (ref 0–0.1)
BASOPHILS NFR BLD AUTO: 0.1 % (ref 0–1)
BUN SERPL-MCNC: 13 MG/DL (ref 7–26)
BUN/CREAT SERPL: 22 (ref 6–25)
CALCIUM SERPL-MCNC: 8.1 MG/DL (ref 8.4–10.2)
CHLORIDE SERPL-SCNC: 105 MMOL/L (ref 98–107)
CO2 SERPL-SCNC: 33 MMOL/L (ref 22–29)
DEPRECATED NEUTROPHILS # BLD AUTO: 12.4 10*3/UL (ref 2.1–6.9)
EGFRCR SERPLBLD CKD-EPI 2021: > 60 ML/MIN (ref 60–?)
EOSINOPHIL # BLD AUTO: 0 10*3/UL (ref 0–0.4)
EOSINOPHIL NFR BLD AUTO: 0 % (ref 0–6)
ERYTHROCYTE [DISTWIDTH] IN CORD BLOOD: 12 % (ref 11.7–14.4)
GLOBULIN PLAS-MCNC: 2.6 G/DL (ref 2.3–3.5)
GLUCOSE SERPLBLD-MCNC: 101 MG/DL (ref 74–118)
HCT VFR BLD AUTO: 27.2 % (ref 34.2–44.1)
HGB BLD-MCNC: 8.6 G/DL (ref 12–16)
LYMPHOCYTES # BLD: 1.1 10*3/UL (ref 1–3.2)
LYMPHOCYTES NFR BLD AUTO: 7.5 % (ref 18–39.1)
MCH RBC QN AUTO: 32.6 PG (ref 28–32)
MCHC RBC AUTO-ENTMCNC: 31.6 G/DL (ref 31–35)
MCV RBC AUTO: 103 FL (ref 81–99)
MONOCYTES # BLD AUTO: 0.8 10*3/UL (ref 0.2–0.8)
MONOCYTES NFR BLD AUTO: 5.6 % (ref 4.4–11.3)
NEUTS SEG NFR BLD AUTO: 86.3 % (ref 38.7–80)
PLATELET # BLD AUTO: 230 X10E3/UL (ref 140–360)
POTASSIUM SERPL-SCNC: 3.7 MMOL/L (ref 3.5–5.1)
RBC # BLD AUTO: 2.64 X10E6/UL (ref 3.6–5.1)
SODIUM SERPL-SCNC: 145 MMOL/L (ref 136–145)

## 2018-03-23 RX ADMIN — BUDESONIDE SCH ML: 0.5 SUSPENSION RESPIRATORY (INHALATION) at 08:33

## 2018-03-23 RX ADMIN — LEVALBUTEROL HYDROCHLORIDE SCH MG: 1.25 SOLUTION RESPIRATORY (INHALATION) at 14:10

## 2018-03-23 RX ADMIN — LEVETIRACETAM SCH MG: 500 TABLET, FILM COATED ORAL at 09:44

## 2018-03-23 RX ADMIN — METHYLPREDNISOLONE SODIUM SUCCINATE SCH MG: 40 INJECTION, POWDER, LYOPHILIZED, FOR SOLUTION INTRAMUSCULAR; INTRAVENOUS at 16:00

## 2018-03-23 RX ADMIN — CLONAZEPAM SCH MG: 0.5 TABLET ORAL at 15:14

## 2018-03-23 RX ADMIN — BISOPROLOL FUMARATE SCH MG: 10 TABLET, COATED ORAL at 12:04

## 2018-03-23 RX ADMIN — BUDESONIDE SCH ML: 0.5 SUSPENSION RESPIRATORY (INHALATION) at 20:00

## 2018-03-23 RX ADMIN — TAZOBACTAM SODIUM AND PIPERACILLIN SODIUM SCH MLS/HR: 375; 3 INJECTION, SOLUTION INTRAVENOUS at 21:47

## 2018-03-23 RX ADMIN — MONTELUKAST SODIUM SCH MG: 10 TABLET, FILM COATED ORAL at 20:10

## 2018-03-23 RX ADMIN — Medication SCH MG: at 09:44

## 2018-03-23 RX ADMIN — LEVALBUTEROL HYDROCHLORIDE SCH MG: 1.25 SOLUTION RESPIRATORY (INHALATION) at 20:00

## 2018-03-23 RX ADMIN — NICOTINE SCH MG: 14 PATCH, EXTENDED RELEASE TRANSDERMAL at 09:45

## 2018-03-23 RX ADMIN — TAZOBACTAM SODIUM AND PIPERACILLIN SODIUM SCH MLS/HR: 375; 3 INJECTION, SOLUTION INTRAVENOUS at 06:32

## 2018-03-23 RX ADMIN — DOXEPIN HYDROCHLORIDE SCH MG: 25 CAPSULE ORAL at 20:10

## 2018-03-23 RX ADMIN — CLONAZEPAM SCH MG: 0.5 TABLET ORAL at 09:44

## 2018-03-23 RX ADMIN — LEVALBUTEROL HYDROCHLORIDE SCH MG: 1.25 SOLUTION RESPIRATORY (INHALATION) at 00:30

## 2018-03-23 RX ADMIN — ASPIRIN 81 MG CHEWABLE TABLET SCH MG: 81 TABLET CHEWABLE at 09:44

## 2018-03-23 RX ADMIN — BENZONATATE SCH MG: 100 CAPSULE ORAL at 12:15

## 2018-03-23 RX ADMIN — LEVALBUTEROL HYDROCHLORIDE SCH MG: 1.25 SOLUTION RESPIRATORY (INHALATION) at 08:33

## 2018-03-23 RX ADMIN — BENZONATATE SCH MG: 100 CAPSULE ORAL at 20:10

## 2018-03-23 RX ADMIN — METHYLPREDNISOLONE SODIUM SUCCINATE SCH MG: 40 INJECTION, POWDER, LYOPHILIZED, FOR SOLUTION INTRAMUSCULAR; INTRAVENOUS at 06:32

## 2018-03-23 RX ADMIN — TAZOBACTAM SODIUM AND PIPERACILLIN SODIUM SCH MLS/HR: 375; 3 INJECTION, SOLUTION INTRAVENOUS at 15:14

## 2018-03-23 RX ADMIN — CLONAZEPAM SCH MG: 0.5 TABLET ORAL at 20:10

## 2018-03-24 VITALS — DIASTOLIC BLOOD PRESSURE: 70 MMHG | SYSTOLIC BLOOD PRESSURE: 159 MMHG

## 2018-03-24 VITALS — DIASTOLIC BLOOD PRESSURE: 62 MMHG | SYSTOLIC BLOOD PRESSURE: 144 MMHG

## 2018-03-24 VITALS — SYSTOLIC BLOOD PRESSURE: 159 MMHG | DIASTOLIC BLOOD PRESSURE: 70 MMHG

## 2018-03-24 VITALS — SYSTOLIC BLOOD PRESSURE: 135 MMHG | DIASTOLIC BLOOD PRESSURE: 65 MMHG

## 2018-03-24 VITALS — DIASTOLIC BLOOD PRESSURE: 70 MMHG | SYSTOLIC BLOOD PRESSURE: 157 MMHG

## 2018-03-24 VITALS — DIASTOLIC BLOOD PRESSURE: 66 MMHG | SYSTOLIC BLOOD PRESSURE: 166 MMHG

## 2018-03-24 VITALS — DIASTOLIC BLOOD PRESSURE: 70 MMHG | SYSTOLIC BLOOD PRESSURE: 152 MMHG

## 2018-03-24 VITALS — DIASTOLIC BLOOD PRESSURE: 58 MMHG | SYSTOLIC BLOOD PRESSURE: 141 MMHG

## 2018-03-24 VITALS — SYSTOLIC BLOOD PRESSURE: 141 MMHG | DIASTOLIC BLOOD PRESSURE: 58 MMHG

## 2018-03-24 LAB
ALBUMIN SERPL-MCNC: 2.2 G/DL (ref 3.5–5)
ALBUMIN/GLOB SERPL: 0.8 {RATIO} (ref 0.8–2)
ALP SERPL-CCNC: 56 IU/L (ref 40–150)
ALT SERPL-CCNC: 12 IU/L (ref 0–55)
ANION GAP SERPL CALC-SCNC: 7.7 MMOL/L (ref 8–16)
BASOPHILS # BLD AUTO: 0 10*3/UL (ref 0–0.1)
BASOPHILS NFR BLD AUTO: 0.1 % (ref 0–1)
BUN SERPL-MCNC: 12 MG/DL (ref 7–26)
BUN/CREAT SERPL: 19 (ref 6–25)
CALCIUM SERPL-MCNC: 8.8 MG/DL (ref 8.4–10.2)
CHLORIDE SERPL-SCNC: 102 MMOL/L (ref 98–107)
CO2 SERPL-SCNC: 37 MMOL/L (ref 22–29)
DEPRECATED NEUTROPHILS # BLD AUTO: 8.3 10*3/UL (ref 2.1–6.9)
EGFRCR SERPLBLD CKD-EPI 2021: > 60 ML/MIN (ref 60–?)
EOSINOPHIL # BLD AUTO: 0 10*3/UL (ref 0–0.4)
EOSINOPHIL NFR BLD AUTO: 0 % (ref 0–6)
ERYTHROCYTE [DISTWIDTH] IN CORD BLOOD: 12.1 % (ref 11.7–14.4)
GLOBULIN PLAS-MCNC: 2.7 G/DL (ref 2.3–3.5)
GLUCOSE SERPLBLD-MCNC: 89 MG/DL (ref 74–118)
HCT VFR BLD AUTO: 30.1 % (ref 34.2–44.1)
HGB BLD-MCNC: 9.5 G/DL (ref 12–16)
LYMPHOCYTES # BLD: 1.7 10*3/UL (ref 1–3.2)
LYMPHOCYTES NFR BLD AUTO: 15.5 % (ref 18–39.1)
MCH RBC QN AUTO: 32.2 PG (ref 28–32)
MCHC RBC AUTO-ENTMCNC: 31.6 G/DL (ref 31–35)
MCV RBC AUTO: 102 FL (ref 81–99)
MONOCYTES # BLD AUTO: 0.9 10*3/UL (ref 0.2–0.8)
MONOCYTES NFR BLD AUTO: 8.1 % (ref 4.4–11.3)
NEUTS SEG NFR BLD AUTO: 74.8 % (ref 38.7–80)
PLATELET # BLD AUTO: 213 X10E3/UL (ref 140–360)
POTASSIUM SERPL-SCNC: 3.7 MMOL/L (ref 3.5–5.1)
RBC # BLD AUTO: 2.95 X10E6/UL (ref 3.6–5.1)
SODIUM SERPL-SCNC: 143 MMOL/L (ref 136–145)

## 2018-03-24 RX ADMIN — BUDESONIDE SCH ML: 0.5 SUSPENSION RESPIRATORY (INHALATION) at 07:00

## 2018-03-24 RX ADMIN — LEVALBUTEROL HYDROCHLORIDE SCH MG: 1.25 SOLUTION RESPIRATORY (INHALATION) at 01:00

## 2018-03-24 RX ADMIN — CLONAZEPAM SCH MG: 0.5 TABLET ORAL at 09:04

## 2018-03-24 RX ADMIN — CLONAZEPAM SCH MG: 0.5 TABLET ORAL at 20:11

## 2018-03-24 RX ADMIN — Medication SCH MG: at 09:04

## 2018-03-24 RX ADMIN — TAZOBACTAM SODIUM AND PIPERACILLIN SODIUM SCH MLS/HR: 375; 3 INJECTION, SOLUTION INTRAVENOUS at 05:18

## 2018-03-24 RX ADMIN — BENZONATATE SCH MG: 100 CAPSULE ORAL at 09:04

## 2018-03-24 RX ADMIN — BUDESONIDE SCH ML: 0.5 SUSPENSION RESPIRATORY (INHALATION) at 07:35

## 2018-03-24 RX ADMIN — BENZONATATE SCH MG: 100 CAPSULE ORAL at 16:37

## 2018-03-24 RX ADMIN — BUDESONIDE SCH ML: 0.5 SUSPENSION RESPIRATORY (INHALATION) at 19:18

## 2018-03-24 RX ADMIN — ASPIRIN 81 MG CHEWABLE TABLET SCH MG: 81 TABLET CHEWABLE at 09:05

## 2018-03-24 RX ADMIN — TAZOBACTAM SODIUM AND PIPERACILLIN SODIUM SCH MLS/HR: 375; 3 INJECTION, SOLUTION INTRAVENOUS at 16:37

## 2018-03-24 RX ADMIN — LEVALBUTEROL HYDROCHLORIDE SCH MG: 1.25 SOLUTION RESPIRATORY (INHALATION) at 14:45

## 2018-03-24 RX ADMIN — Medication PRN ML: at 17:00

## 2018-03-24 RX ADMIN — LEVALBUTEROL HYDROCHLORIDE SCH MG: 1.25 SOLUTION RESPIRATORY (INHALATION) at 07:35

## 2018-03-24 RX ADMIN — TAZOBACTAM SODIUM AND PIPERACILLIN SODIUM SCH MLS/HR: 375; 3 INJECTION, SOLUTION INTRAVENOUS at 16:49

## 2018-03-24 RX ADMIN — TAZOBACTAM SODIUM AND PIPERACILLIN SODIUM SCH MLS/HR: 375; 3 INJECTION, SOLUTION INTRAVENOUS at 14:00

## 2018-03-24 RX ADMIN — NICOTINE SCH MG: 14 PATCH, EXTENDED RELEASE TRANSDERMAL at 09:06

## 2018-03-24 RX ADMIN — LEVETIRACETAM SCH MG: 500 TABLET, FILM COATED ORAL at 09:05

## 2018-03-24 RX ADMIN — METHYLPREDNISOLONE SODIUM SUCCINATE SCH MG: 40 INJECTION, POWDER, LYOPHILIZED, FOR SOLUTION INTRAMUSCULAR; INTRAVENOUS at 09:03

## 2018-03-24 RX ADMIN — METHYLPREDNISOLONE SODIUM SUCCINATE SCH MG: 40 INJECTION, POWDER, LYOPHILIZED, FOR SOLUTION INTRAMUSCULAR; INTRAVENOUS at 16:48

## 2018-03-24 RX ADMIN — BENZONATATE SCH MG: 100 CAPSULE ORAL at 20:11

## 2018-03-24 RX ADMIN — DOXEPIN HYDROCHLORIDE SCH MG: 25 CAPSULE ORAL at 20:11

## 2018-03-24 RX ADMIN — CLONAZEPAM SCH MG: 0.5 TABLET ORAL at 16:37

## 2018-03-24 RX ADMIN — LEVALBUTEROL HYDROCHLORIDE SCH MG: 1.25 SOLUTION RESPIRATORY (INHALATION) at 19:10

## 2018-03-24 RX ADMIN — BISOPROLOL FUMARATE SCH MG: 10 TABLET, COATED ORAL at 09:39

## 2018-03-24 RX ADMIN — MONTELUKAST SODIUM SCH MG: 10 TABLET, FILM COATED ORAL at 20:11

## 2018-03-25 VITALS — SYSTOLIC BLOOD PRESSURE: 161 MMHG | DIASTOLIC BLOOD PRESSURE: 61 MMHG

## 2018-03-25 VITALS — SYSTOLIC BLOOD PRESSURE: 116 MMHG | DIASTOLIC BLOOD PRESSURE: 55 MMHG

## 2018-03-25 VITALS — DIASTOLIC BLOOD PRESSURE: 60 MMHG | SYSTOLIC BLOOD PRESSURE: 139 MMHG

## 2018-03-25 VITALS — SYSTOLIC BLOOD PRESSURE: 109 MMHG | DIASTOLIC BLOOD PRESSURE: 62 MMHG

## 2018-03-25 VITALS — SYSTOLIC BLOOD PRESSURE: 149 MMHG | DIASTOLIC BLOOD PRESSURE: 66 MMHG

## 2018-03-25 VITALS — SYSTOLIC BLOOD PRESSURE: 147 MMHG | DIASTOLIC BLOOD PRESSURE: 97 MMHG

## 2018-03-25 RX ADMIN — LEVALBUTEROL HYDROCHLORIDE SCH MG: 1.25 SOLUTION RESPIRATORY (INHALATION) at 07:12

## 2018-03-25 RX ADMIN — BISOPROLOL FUMARATE SCH MG: 10 TABLET, COATED ORAL at 09:04

## 2018-03-25 RX ADMIN — BENZONATATE SCH MG: 100 CAPSULE ORAL at 09:03

## 2018-03-25 RX ADMIN — Medication SCH MG: at 09:03

## 2018-03-25 RX ADMIN — CLONAZEPAM SCH MG: 0.5 TABLET ORAL at 15:00

## 2018-03-25 RX ADMIN — CLONAZEPAM SCH MG: 0.5 TABLET ORAL at 09:03

## 2018-03-25 RX ADMIN — LEVETIRACETAM SCH MG: 500 TABLET, FILM COATED ORAL at 09:03

## 2018-03-25 RX ADMIN — DOXEPIN HYDROCHLORIDE SCH MG: 25 CAPSULE ORAL at 20:02

## 2018-03-25 RX ADMIN — LEVALBUTEROL HYDROCHLORIDE SCH MG: 1.25 SOLUTION RESPIRATORY (INHALATION) at 01:00

## 2018-03-25 RX ADMIN — MONTELUKAST SODIUM SCH MG: 10 TABLET, FILM COATED ORAL at 20:02

## 2018-03-25 RX ADMIN — CLONAZEPAM SCH MG: 0.5 TABLET ORAL at 20:02

## 2018-03-25 RX ADMIN — METHYLPREDNISOLONE SODIUM SUCCINATE SCH MG: 40 INJECTION, POWDER, LYOPHILIZED, FOR SOLUTION INTRAMUSCULAR; INTRAVENOUS at 09:03

## 2018-03-25 RX ADMIN — TAZOBACTAM SODIUM AND PIPERACILLIN SODIUM SCH MLS/HR: 375; 3 INJECTION, SOLUTION INTRAVENOUS at 00:38

## 2018-03-25 RX ADMIN — BUDESONIDE SCH ML: 0.5 SUSPENSION RESPIRATORY (INHALATION) at 18:50

## 2018-03-25 RX ADMIN — LEVALBUTEROL HYDROCHLORIDE SCH MG: 1.25 SOLUTION RESPIRATORY (INHALATION) at 18:50

## 2018-03-25 RX ADMIN — BENZONATATE SCH MG: 100 CAPSULE ORAL at 20:02

## 2018-03-25 RX ADMIN — TAZOBACTAM SODIUM AND PIPERACILLIN SODIUM SCH MLS/HR: 375; 3 INJECTION, SOLUTION INTRAVENOUS at 09:03

## 2018-03-25 RX ADMIN — TAZOBACTAM SODIUM AND PIPERACILLIN SODIUM SCH MLS/HR: 375; 3 INJECTION, SOLUTION INTRAVENOUS at 16:50

## 2018-03-25 RX ADMIN — LEVALBUTEROL HYDROCHLORIDE SCH MG: 1.25 SOLUTION RESPIRATORY (INHALATION) at 13:28

## 2018-03-25 RX ADMIN — METHYLPREDNISOLONE SODIUM SUCCINATE SCH MG: 40 INJECTION, POWDER, LYOPHILIZED, FOR SOLUTION INTRAMUSCULAR; INTRAVENOUS at 16:50

## 2018-03-25 RX ADMIN — NICOTINE SCH MG: 14 PATCH, EXTENDED RELEASE TRANSDERMAL at 09:03

## 2018-03-25 RX ADMIN — BUDESONIDE SCH ML: 0.5 SUSPENSION RESPIRATORY (INHALATION) at 07:12

## 2018-03-25 RX ADMIN — ASPIRIN 81 MG CHEWABLE TABLET SCH MG: 81 TABLET CHEWABLE at 09:03

## 2018-03-25 RX ADMIN — BENZONATATE SCH MG: 100 CAPSULE ORAL at 15:00

## 2018-03-26 VITALS — SYSTOLIC BLOOD PRESSURE: 144 MMHG | DIASTOLIC BLOOD PRESSURE: 67 MMHG

## 2018-03-26 VITALS — DIASTOLIC BLOOD PRESSURE: 83 MMHG | SYSTOLIC BLOOD PRESSURE: 164 MMHG

## 2018-03-26 VITALS — DIASTOLIC BLOOD PRESSURE: 67 MMHG | SYSTOLIC BLOOD PRESSURE: 166 MMHG

## 2018-03-26 VITALS — DIASTOLIC BLOOD PRESSURE: 62 MMHG | SYSTOLIC BLOOD PRESSURE: 154 MMHG

## 2018-03-26 VITALS — SYSTOLIC BLOOD PRESSURE: 164 MMHG | DIASTOLIC BLOOD PRESSURE: 83 MMHG

## 2018-03-26 VITALS — DIASTOLIC BLOOD PRESSURE: 64 MMHG | SYSTOLIC BLOOD PRESSURE: 154 MMHG

## 2018-03-26 VITALS — DIASTOLIC BLOOD PRESSURE: 67 MMHG | SYSTOLIC BLOOD PRESSURE: 144 MMHG

## 2018-03-26 VITALS — SYSTOLIC BLOOD PRESSURE: 146 MMHG | DIASTOLIC BLOOD PRESSURE: 79 MMHG

## 2018-03-26 VITALS — SYSTOLIC BLOOD PRESSURE: 150 MMHG | DIASTOLIC BLOOD PRESSURE: 72 MMHG

## 2018-03-26 LAB
ALBUMIN SERPL-MCNC: 2.4 G/DL (ref 3.5–5)
ALBUMIN/GLOB SERPL: 0.9 {RATIO} (ref 0.8–2)
ALP SERPL-CCNC: 51 IU/L (ref 40–150)
ALT SERPL-CCNC: 11 IU/L (ref 0–55)
ANION GAP SERPL CALC-SCNC: 10.4 MMOL/L (ref 8–16)
BASOPHILS # BLD AUTO: 0 10*3/UL (ref 0–0.1)
BASOPHILS NFR BLD AUTO: 0.2 % (ref 0–1)
BUN SERPL-MCNC: 7 MG/DL (ref 7–26)
BUN/CREAT SERPL: 10 (ref 6–25)
CALCIUM SERPL-MCNC: 8.6 MG/DL (ref 8.4–10.2)
CHLORIDE SERPL-SCNC: 102 MMOL/L (ref 98–107)
CO2 SERPL-SCNC: 38 MMOL/L (ref 22–29)
DEPRECATED NEUTROPHILS # BLD AUTO: 5.6 10*3/UL (ref 2.1–6.9)
EGFRCR SERPLBLD CKD-EPI 2021: > 60 ML/MIN (ref 60–?)
EOSINOPHIL # BLD AUTO: 0.1 10*3/UL (ref 0–0.4)
EOSINOPHIL NFR BLD AUTO: 0.7 % (ref 0–6)
ERYTHROCYTE [DISTWIDTH] IN CORD BLOOD: 12 % (ref 11.7–14.4)
GLOBULIN PLAS-MCNC: 2.6 G/DL (ref 2.3–3.5)
GLUCOSE SERPLBLD-MCNC: 74 MG/DL (ref 74–118)
HCT VFR BLD AUTO: 30.4 % (ref 34.2–44.1)
HGB BLD-MCNC: 9.6 G/DL (ref 12–16)
LYMPHOCYTES # BLD: 2.4 10*3/UL (ref 1–3.2)
LYMPHOCYTES NFR BLD AUTO: 26.8 % (ref 18–39.1)
MCH RBC QN AUTO: 32 PG (ref 28–32)
MCHC RBC AUTO-ENTMCNC: 31.6 G/DL (ref 31–35)
MCV RBC AUTO: 101.3 FL (ref 81–99)
MONOCYTES # BLD AUTO: 0.8 10*3/UL (ref 0.2–0.8)
MONOCYTES NFR BLD AUTO: 9.2 % (ref 4.4–11.3)
NEUTS SEG NFR BLD AUTO: 61.2 % (ref 38.7–80)
PLATELET # BLD AUTO: 244 X10E3/UL (ref 140–360)
POTASSIUM SERPL-SCNC: 3.4 MMOL/L (ref 3.5–5.1)
RBC # BLD AUTO: 3 X10E6/UL (ref 3.6–5.1)
SODIUM SERPL-SCNC: 147 MMOL/L (ref 136–145)

## 2018-03-26 RX ADMIN — CLONAZEPAM SCH MG: 0.5 TABLET ORAL at 20:13

## 2018-03-26 RX ADMIN — BENZONATATE SCH MG: 100 CAPSULE ORAL at 20:13

## 2018-03-26 RX ADMIN — LEVALBUTEROL HYDROCHLORIDE SCH MG: 1.25 SOLUTION RESPIRATORY (INHALATION) at 07:45

## 2018-03-26 RX ADMIN — METHYLPREDNISOLONE SODIUM SUCCINATE SCH MG: 40 INJECTION, POWDER, LYOPHILIZED, FOR SOLUTION INTRAMUSCULAR; INTRAVENOUS at 17:35

## 2018-03-26 RX ADMIN — LEVETIRACETAM SCH MG: 500 TABLET, FILM COATED ORAL at 09:12

## 2018-03-26 RX ADMIN — CLONAZEPAM SCH MG: 0.5 TABLET ORAL at 09:13

## 2018-03-26 RX ADMIN — BENZONATATE SCH MG: 100 CAPSULE ORAL at 16:03

## 2018-03-26 RX ADMIN — ASPIRIN 81 MG CHEWABLE TABLET SCH MG: 81 TABLET CHEWABLE at 09:09

## 2018-03-26 RX ADMIN — Medication SCH MG: at 09:14

## 2018-03-26 RX ADMIN — NICOTINE SCH MG: 14 PATCH, EXTENDED RELEASE TRANSDERMAL at 09:16

## 2018-03-26 RX ADMIN — LEVALBUTEROL HYDROCHLORIDE SCH MG: 1.25 SOLUTION RESPIRATORY (INHALATION) at 13:12

## 2018-03-26 RX ADMIN — FLUCONAZOLE SCH MG: 100 TABLET ORAL at 09:10

## 2018-03-26 RX ADMIN — METHYLPREDNISOLONE SODIUM SUCCINATE SCH MG: 40 INJECTION, POWDER, LYOPHILIZED, FOR SOLUTION INTRAMUSCULAR; INTRAVENOUS at 09:08

## 2018-03-26 RX ADMIN — DOXEPIN HYDROCHLORIDE SCH MG: 25 CAPSULE ORAL at 20:13

## 2018-03-26 RX ADMIN — LEVALBUTEROL HYDROCHLORIDE SCH MG: 1.25 SOLUTION RESPIRATORY (INHALATION) at 01:00

## 2018-03-26 RX ADMIN — BENZONATATE SCH MG: 100 CAPSULE ORAL at 09:14

## 2018-03-26 RX ADMIN — MONTELUKAST SODIUM SCH MG: 10 TABLET, FILM COATED ORAL at 20:13

## 2018-03-26 RX ADMIN — BISOPROLOL FUMARATE SCH MG: 10 TABLET, COATED ORAL at 09:16

## 2018-03-26 RX ADMIN — LEVALBUTEROL HYDROCHLORIDE SCH MG: 1.25 SOLUTION RESPIRATORY (INHALATION) at 20:30

## 2018-03-26 RX ADMIN — CLONAZEPAM SCH MG: 0.5 TABLET ORAL at 16:03

## 2018-03-26 RX ADMIN — BUDESONIDE SCH ML: 0.5 SUSPENSION RESPIRATORY (INHALATION) at 07:45

## 2018-03-26 RX ADMIN — TAZOBACTAM SODIUM AND PIPERACILLIN SODIUM SCH MLS/HR: 375; 3 INJECTION, SOLUTION INTRAVENOUS at 17:36

## 2018-03-26 RX ADMIN — BUDESONIDE SCH ML: 0.5 SUSPENSION RESPIRATORY (INHALATION) at 20:30

## 2018-03-26 RX ADMIN — TAZOBACTAM SODIUM AND PIPERACILLIN SODIUM SCH MLS/HR: 375; 3 INJECTION, SOLUTION INTRAVENOUS at 00:08

## 2018-03-26 RX ADMIN — TAZOBACTAM SODIUM AND PIPERACILLIN SODIUM SCH MLS/HR: 375; 3 INJECTION, SOLUTION INTRAVENOUS at 09:09

## 2018-03-26 NOTE — PROGRESS NOTE
DATE:  March 26, 2018 



PSYCHIATRIC PROGRESS NOTE 



Patient evaluated and events noted.  The patient was found to be in her 

room with her  nearby.  The patient is alert, awake, and oriented to 

situation.  She is not in restraint.  She is pleasant and calm and not 

confused or agitated.  She knows where she is and the current year.  She 

remembers the events that led up to her hospitalization.  She denies 

depression.  She denies anxiety.  She denies any hallucinations.  She has 

not received any p.r.n. IM medications since March 22, 2018.  She is 

resuming medications and denies any side effects.



ASSESSMENT:   Unspecified psychosis/delirium, multifactorial, resolved; 

rule out dementia.



PLAN

1. To continue with Klonopin 0.5 mg p.o. 3 times a day scheduled.

2. Continue doxepin. 

3. Continue with p.r.n. Haldol, Seroquel and Ativan.

4. Supportive therapy.

5. At this point, the patient is cleared from psychiatry standpoint.



Thank you for this consultation.



Dictated by:  LENO Otoole 







DD:  03/26/2018 16:33

DT:  03/26/2018 16:43

Job#:  [id number]

## 2018-03-27 VITALS — DIASTOLIC BLOOD PRESSURE: 62 MMHG | SYSTOLIC BLOOD PRESSURE: 156 MMHG

## 2018-03-27 VITALS — DIASTOLIC BLOOD PRESSURE: 68 MMHG | SYSTOLIC BLOOD PRESSURE: 145 MMHG

## 2018-03-27 VITALS — DIASTOLIC BLOOD PRESSURE: 61 MMHG | SYSTOLIC BLOOD PRESSURE: 132 MMHG

## 2018-03-27 VITALS — SYSTOLIC BLOOD PRESSURE: 156 MMHG | DIASTOLIC BLOOD PRESSURE: 62 MMHG

## 2018-03-27 RX ADMIN — TAZOBACTAM SODIUM AND PIPERACILLIN SODIUM SCH MLS/HR: 375; 3 INJECTION, SOLUTION INTRAVENOUS at 09:15

## 2018-03-27 RX ADMIN — TAZOBACTAM SODIUM AND PIPERACILLIN SODIUM SCH MLS/HR: 375; 3 INJECTION, SOLUTION INTRAVENOUS at 00:50

## 2018-03-27 RX ADMIN — BENZONATATE SCH MG: 100 CAPSULE ORAL at 15:16

## 2018-03-27 RX ADMIN — BENZONATATE SCH MG: 100 CAPSULE ORAL at 09:19

## 2018-03-27 RX ADMIN — ASPIRIN 81 MG CHEWABLE TABLET SCH MG: 81 TABLET CHEWABLE at 09:15

## 2018-03-27 RX ADMIN — CLONAZEPAM SCH MG: 0.5 TABLET ORAL at 15:16

## 2018-03-27 RX ADMIN — LEVALBUTEROL HYDROCHLORIDE SCH MG: 1.25 SOLUTION RESPIRATORY (INHALATION) at 00:30

## 2018-03-27 RX ADMIN — Medication SCH MG: at 09:19

## 2018-03-27 RX ADMIN — CLONAZEPAM SCH MG: 0.5 TABLET ORAL at 09:18

## 2018-03-27 RX ADMIN — BUDESONIDE SCH ML: 0.5 SUSPENSION RESPIRATORY (INHALATION) at 06:30

## 2018-03-27 RX ADMIN — BISOPROLOL FUMARATE SCH MG: 10 TABLET, COATED ORAL at 09:33

## 2018-03-27 RX ADMIN — LEVALBUTEROL HYDROCHLORIDE SCH MG: 1.25 SOLUTION RESPIRATORY (INHALATION) at 13:51

## 2018-03-27 RX ADMIN — LEVALBUTEROL HYDROCHLORIDE SCH MG: 1.25 SOLUTION RESPIRATORY (INHALATION) at 06:30

## 2018-03-27 RX ADMIN — NICOTINE SCH MG: 14 PATCH, EXTENDED RELEASE TRANSDERMAL at 09:19

## 2018-03-27 RX ADMIN — METHYLPREDNISOLONE SODIUM SUCCINATE SCH MG: 40 INJECTION, POWDER, LYOPHILIZED, FOR SOLUTION INTRAMUSCULAR; INTRAVENOUS at 09:15

## 2018-03-27 RX ADMIN — LEVETIRACETAM SCH MG: 500 TABLET, FILM COATED ORAL at 09:17

## 2018-03-27 RX ADMIN — FLUCONAZOLE SCH MG: 100 TABLET ORAL at 09:16

## 2018-04-24 ENCOUNTER — HOSPITAL ENCOUNTER (OUTPATIENT)
Dept: HOSPITAL 88 - CT | Age: 73
End: 2018-04-24
Attending: INTERNAL MEDICINE
Payer: MEDICARE

## 2018-04-24 DIAGNOSIS — R91.8: Primary | ICD-10-CM

## 2018-04-24 LAB
BUN SERPL-MCNC: 11 MG/DL (ref 7–26)
BUN/CREAT SERPL: 11 (ref 6–25)
EGFRCR SERPLBLD CKD-EPI 2021: 53 ML/MIN (ref 60–?)

## 2018-04-24 PROCEDURE — 36415 COLL VENOUS BLD VENIPUNCTURE: CPT

## 2018-04-24 PROCEDURE — 71260 CT THORAX DX C+: CPT

## 2018-04-24 PROCEDURE — 84520 ASSAY OF UREA NITROGEN: CPT

## 2018-04-24 PROCEDURE — 82565 ASSAY OF CREATININE: CPT

## 2018-04-24 NOTE — DIAGNOSTIC IMAGING REPORT
PROCEDURE:

CT scan of the chest  WITH intravenous contrast, using standard 

protocol.

 

TECHNIQUE:

The chest was scanned utilizing a multidetector helical scanner from 

the lung apex through the level of the adrenal glands after the IV 

administration of 100 cc of Isovue 370.  Coronal and sagittal 

multiplanar reformations were obtained.

 

DLP:  351.62 mGy-cm

 

COMPARISON: 3/21/2018 chest CT

 

INDICATIONS:  LUNG MASS

    

FINDINGS:

Lines/tubes:  None.

 

Lungs and Airways: Again there are findings of severe emphysema. 

Ill-defined ground glass opacities in the right upper lobe have 

decreased in size and now appear to represent scarring. Improvement in 

the opacities in the left lung and right lower lobe. Again identified 

is partial collapse of the right middle lobe that is smaller compared 

to prior. 

 

Pleura: The pleural spaces are clear.

 

Heart and mediastinum: The thyroid gland is normal. No significant 

mediastinal, hilar or axillary lymphadenopathy is seen. Decrease in 

size of the mediastinal lymph nodes. The heart and pericardium are 

within normal limits. Prominent pulmonary artery measures 3.4 cm.

 

Soft tissues: Normal.

 

Abdomen: Limited contrast-enhanced views of the upper abdomen show no 

abnormality within the visualized spleen, pancreas, or kidneys. Gastric 

wall thickening is unchanged. Focal fatty infiltration in the liver 

near the falciform ligament is unchanged. The adrenal glands are 

normal.

 

Bones: Stable compression deformity of L1.

 

IMPRESSION: 

 

1. Changes of severe centrilobular emphysema.

2. Resolving multifocal pneumonia with a decrease in size of the 

mediastinal lymph nodes.

3. Apparent right pulmonary mass is representative of collapse of the 

right middle lobe which is smaller. 

4. No change in the gastric wall thickening. 

 

 

Alfred Martin D.O.  

Dictated by:  Alfrde Martin D.O. on 4/24/2018 at 8:51     

Electronically approved by:  Alfred Martin D.O. on 4/24/2018 at 8:51